# Patient Record
Sex: FEMALE | Race: WHITE | HISPANIC OR LATINO | URBAN - METROPOLITAN AREA
[De-identification: names, ages, dates, MRNs, and addresses within clinical notes are randomized per-mention and may not be internally consistent; named-entity substitution may affect disease eponyms.]

---

## 2022-08-27 ENCOUNTER — INPATIENT (INPATIENT)
Facility: HOSPITAL | Age: 32
LOS: 3 days | Discharge: HOME | DRG: 103 | End: 2022-08-31
Attending: HOSPITALIST | Admitting: STUDENT IN AN ORGANIZED HEALTH CARE EDUCATION/TRAINING PROGRAM
Payer: COMMERCIAL

## 2022-08-27 VITALS
WEIGHT: 160.06 LBS | SYSTOLIC BLOOD PRESSURE: 120 MMHG | TEMPERATURE: 99 F | HEART RATE: 126 BPM | DIASTOLIC BLOOD PRESSURE: 74 MMHG | OXYGEN SATURATION: 98 % | RESPIRATION RATE: 20 BRPM

## 2022-08-27 LAB
ALBUMIN SERPL ELPH-MCNC: 4.2 G/DL — SIGNIFICANT CHANGE UP (ref 3.3–5.2)
ALP SERPL-CCNC: 80 U/L — SIGNIFICANT CHANGE UP (ref 40–120)
ALT FLD-CCNC: 16 U/L — SIGNIFICANT CHANGE UP
ANION GAP SERPL CALC-SCNC: 19 MMOL/L — HIGH (ref 5–17)
APAP SERPL-MCNC: <3 UG/ML — LOW (ref 10–26)
AST SERPL-CCNC: 25 U/L — SIGNIFICANT CHANGE UP
BASE EXCESS BLDV CALC-SCNC: -2 MMOL/L — SIGNIFICANT CHANGE UP (ref -2–3)
BASE EXCESS BLDV CALC-SCNC: -6.3 MMOL/L — LOW (ref -2–3)
BASOPHILS # BLD AUTO: 0.04 K/UL — SIGNIFICANT CHANGE UP (ref 0–0.2)
BASOPHILS NFR BLD AUTO: 0.5 % — SIGNIFICANT CHANGE UP (ref 0–2)
BILIRUB SERPL-MCNC: 0.5 MG/DL — SIGNIFICANT CHANGE UP (ref 0.4–2)
BUN SERPL-MCNC: 9.2 MG/DL — SIGNIFICANT CHANGE UP (ref 8–20)
CA-I SERPL-SCNC: 1.01 MMOL/L — LOW (ref 1.15–1.33)
CA-I SERPL-SCNC: 1.01 MMOL/L — LOW (ref 1.15–1.33)
CALCIUM SERPL-MCNC: 9 MG/DL — SIGNIFICANT CHANGE UP (ref 8.4–10.5)
CHLORIDE BLDV-SCNC: 109 MMOL/L — HIGH (ref 98–107)
CHLORIDE BLDV-SCNC: 110 MMOL/L — HIGH (ref 98–107)
CHLORIDE SERPL-SCNC: 104 MMOL/L — SIGNIFICANT CHANGE UP (ref 98–107)
CO2 SERPL-SCNC: 14 MMOL/L — LOW (ref 22–29)
CREAT SERPL-MCNC: 0.83 MG/DL — SIGNIFICANT CHANGE UP (ref 0.5–1.3)
EGFR: 97 ML/MIN/1.73M2 — SIGNIFICANT CHANGE UP
EOSINOPHIL # BLD AUTO: 0.11 K/UL — SIGNIFICANT CHANGE UP (ref 0–0.5)
EOSINOPHIL NFR BLD AUTO: 1.4 % — SIGNIFICANT CHANGE UP (ref 0–6)
ETHANOL SERPL-MCNC: <10 MG/DL — SIGNIFICANT CHANGE UP (ref 0–9)
GAS PNL BLDV: 139 MMOL/L — SIGNIFICANT CHANGE UP (ref 136–145)
GAS PNL BLDV: 140 MMOL/L — SIGNIFICANT CHANGE UP (ref 136–145)
GAS PNL BLDV: SIGNIFICANT CHANGE UP
GAS PNL BLDV: SIGNIFICANT CHANGE UP
GLUCOSE BLDV-MCNC: 110 MG/DL — HIGH (ref 70–99)
GLUCOSE BLDV-MCNC: 117 MG/DL — HIGH (ref 70–99)
GLUCOSE SERPL-MCNC: 115 MG/DL — HIGH (ref 70–99)
HCG SERPL-ACNC: <4 MIU/ML — SIGNIFICANT CHANGE UP
HCO3 BLDV-SCNC: 16 MMOL/L — LOW (ref 22–29)
HCO3 BLDV-SCNC: 23 MMOL/L — SIGNIFICANT CHANGE UP (ref 22–29)
HCT VFR BLD CALC: 36.4 % — SIGNIFICANT CHANGE UP (ref 34.5–45)
HCT VFR BLDA CALC: 35 % — SIGNIFICANT CHANGE UP
HCT VFR BLDA CALC: 40 % — SIGNIFICANT CHANGE UP
HGB BLD CALC-MCNC: 11.6 G/DL — LOW (ref 11.7–16.1)
HGB BLD CALC-MCNC: 13.3 G/DL — SIGNIFICANT CHANGE UP (ref 11.7–16.1)
HGB BLD-MCNC: 12.4 G/DL — SIGNIFICANT CHANGE UP (ref 11.5–15.5)
IMM GRANULOCYTES NFR BLD AUTO: 0.4 % — SIGNIFICANT CHANGE UP (ref 0–1.5)
LACTATE BLDV-MCNC: 2.1 MMOL/L — HIGH (ref 0.5–2)
LACTATE BLDV-MCNC: 6.1 MMOL/L — CRITICAL HIGH (ref 0.5–2)
LIDOCAIN IGE QN: 24 U/L — SIGNIFICANT CHANGE UP (ref 22–51)
LYMPHOCYTES # BLD AUTO: 2.23 K/UL — SIGNIFICANT CHANGE UP (ref 1–3.3)
LYMPHOCYTES # BLD AUTO: 28.3 % — SIGNIFICANT CHANGE UP (ref 13–44)
MCHC RBC-ENTMCNC: 29.6 PG — SIGNIFICANT CHANGE UP (ref 27–34)
MCHC RBC-ENTMCNC: 34.1 GM/DL — SIGNIFICANT CHANGE UP (ref 32–36)
MCV RBC AUTO: 86.9 FL — SIGNIFICANT CHANGE UP (ref 80–100)
MONOCYTES # BLD AUTO: 0.63 K/UL — SIGNIFICANT CHANGE UP (ref 0–0.9)
MONOCYTES NFR BLD AUTO: 8 % — SIGNIFICANT CHANGE UP (ref 2–14)
NEUTROPHILS # BLD AUTO: 4.85 K/UL — SIGNIFICANT CHANGE UP (ref 1.8–7.4)
NEUTROPHILS NFR BLD AUTO: 61.4 % — SIGNIFICANT CHANGE UP (ref 43–77)
PCO2 BLDV: 39 MMHG — SIGNIFICANT CHANGE UP (ref 39–42)
PCO2 BLDV: <20 MMHG — LOW (ref 39–42)
PH BLDV: 7.38 — SIGNIFICANT CHANGE UP (ref 7.32–7.43)
PH BLDV: 7.54 — HIGH (ref 7.32–7.43)
PLATELET # BLD AUTO: 351 K/UL — SIGNIFICANT CHANGE UP (ref 150–400)
PO2 BLDV: 56 MMHG — HIGH (ref 25–45)
PO2 BLDV: 94 MMHG — HIGH (ref 25–45)
POTASSIUM BLDV-SCNC: 3.4 MMOL/L — LOW (ref 3.5–5.1)
POTASSIUM BLDV-SCNC: 3.6 MMOL/L — SIGNIFICANT CHANGE UP (ref 3.5–5.1)
POTASSIUM SERPL-MCNC: 3.1 MMOL/L — LOW (ref 3.5–5.3)
POTASSIUM SERPL-SCNC: 3.1 MMOL/L — LOW (ref 3.5–5.3)
PROT SERPL-MCNC: 7.2 G/DL — SIGNIFICANT CHANGE UP (ref 6.6–8.7)
PROT UR-MCNC: NEGATIVE — SIGNIFICANT CHANGE UP
RBC # BLD: 4.19 M/UL — SIGNIFICANT CHANGE UP (ref 3.8–5.2)
RBC # FLD: 12.1 % — SIGNIFICANT CHANGE UP (ref 10.3–14.5)
SALICYLATES SERPL-MCNC: <0.6 MG/DL — LOW (ref 10–20)
SAO2 % BLDV: 87.5 % — SIGNIFICANT CHANGE UP
SAO2 % BLDV: 99 % — SIGNIFICANT CHANGE UP
SODIUM SERPL-SCNC: 137 MMOL/L — SIGNIFICANT CHANGE UP (ref 135–145)
T3 SERPL-MCNC: 172 NG/DL — SIGNIFICANT CHANGE UP (ref 80–200)
T4 AB SER-ACNC: 10 UG/DL — SIGNIFICANT CHANGE UP (ref 4.5–12)
TROPONIN T SERPL-MCNC: <0.01 NG/ML — SIGNIFICANT CHANGE UP (ref 0–0.06)
TROPONIN T SERPL-MCNC: <0.01 NG/ML — SIGNIFICANT CHANGE UP (ref 0–0.06)
TSH SERPL-MCNC: 3.71 UIU/ML — SIGNIFICANT CHANGE UP (ref 0.27–4.2)
WBC # BLD: 7.89 K/UL — SIGNIFICANT CHANGE UP (ref 3.8–10.5)
WBC # FLD AUTO: 7.89 K/UL — SIGNIFICANT CHANGE UP (ref 3.8–10.5)

## 2022-08-27 PROCEDURE — 71045 X-RAY EXAM CHEST 1 VIEW: CPT | Mod: 26

## 2022-08-27 PROCEDURE — 70498 CT ANGIOGRAPHY NECK: CPT | Mod: 26,MA

## 2022-08-27 PROCEDURE — 99291 CRITICAL CARE FIRST HOUR: CPT

## 2022-08-27 PROCEDURE — 70496 CT ANGIOGRAPHY HEAD: CPT | Mod: 26,MA

## 2022-08-27 RX ORDER — LEVETIRACETAM 250 MG/1
1000 TABLET, FILM COATED ORAL ONCE
Refills: 0 | Status: COMPLETED | OUTPATIENT
Start: 2022-08-27 | End: 2022-08-27

## 2022-08-27 RX ADMIN — LEVETIRACETAM 1000 MILLIGRAM(S): 250 TABLET, FILM COATED ORAL at 23:08

## 2022-08-27 NOTE — ED ADULT TRIAGE NOTE - CHIEF COMPLAINT QUOTE
BIBEMS for a suspected panic attack. PT was anxious and not responding to family and EMS unless there was a sternal rub./ PT found to be in SVT by EMS and given 6mg and 12mg of adenosine and then she broke. PT know minimal responsive but shaking her head yes when asked if she has chest pain. No PMH. ED attending called ot bedside.

## 2022-08-27 NOTE — ED PROVIDER NOTE - PROGRESS NOTE DETAILS
Given the significant and immediate threats to this patient based on initial presentation, the benefits of emergency contrast-enhanced CT imaging without obtaining GFR/creatinine serum level results greatly outweigh the potential risk of harm due to contrast-induced nephropathy. Ludmila Barry. PGY-2 Patient more oriented and family interviewed at bedside. Patient reports she was vacuuming when she began to have a migraine and felt-lightheaded. She sat down and then called for help from her mother. Last thing she remembers is waking up in the ED. Mom reports that the patient started to shake and her eyes rolled to the back of her head. This happened twice and then she called the ambulance. No urinary incontinence. Patient reports a similar event happened a couple of months ago. She went to a different hospital and then was discharged from ED after evaluation. Denies h/o of seizures, head trauma, drug use, or new recent medications. No recent illness. Spoke to family about possible admission for evaluation by neurology given episode has happened 3 times so far. Lactate 6. Patient fully oriented and at baseline currently. Ludmila Barry. PGY-2 Patient's NAme: Georgina Andreea  : 1990

## 2022-08-27 NOTE — ED PROVIDER NOTE - ATTENDING CONTRIBUTION TO CARE
Dr. Schneider : I have personally seen and examined this patient at the bedside. I have fully participated in the care of this patient. I have reviewed all pertinent clinical information, including history, physical exam, plan and the Resident's note and agree except as noted.     30yF with no known pmh presenting with AMS. Per EMS patient reportedly patient was unresponsive to family and staring. On arrival EMS noted patient to be tachycardic. They gave 6mg and 12mg of adenosine for presumed SVT. pt able to shake head to yes or no questions. understands all questions but is not speaking. possible argument with family before this started?    Denies f/c/n/v/cp/sob/palpitations/cough/abd.pain/d/c/dysuria/hematuria.  no sick contacts/recent travel.    PE:  head; atraumatic normocephalic  eyes: perrla  Heart: rrr s1s2  lungs: ctab  abd: soft, nt nd + bs no rebound/guarding no cva ttp  le: no swelling no calf ttp  back: no midline cervical/thoracic/lumbar ttp  neuro: follos commands moving all extremeties non focal exam      -->conversions disorder? will check ct head cta as pt not speaking but no other neuro deifcit; labs lactate reassess

## 2022-08-27 NOTE — ED PROVIDER NOTE - PHYSICAL EXAMINATION
Gen: staring and unresponsive  Head: normocephalic, atraumatic  EENT: EOMI, PERRLA  Lung: no increased work of breathing, CTABL  CV: normal s1/s2, tachy2+ radial pulses b/l  Abd: soft, non-tender, non-distended, no rebound tenderness or guarding  MSK: No edema, no visible deformities, full range of motion in all 4 extremities  Neuro: No focal neurologic deficits

## 2022-08-27 NOTE — ED PROVIDER NOTE - OBJECTIVE STATEMENT
30yF with no known pmh presenting with AMS. Per EMS patient reportedly patient 30yF with no known pmh presenting with AMS. Per EMS patient reportedly patient was unresponsive to family and staring. On arrival EMS noted patient to be tachycardic. They gave 6mg and 12mg of adenosine for presumed SVT. Attending and myself examined rhythm strip. HR was in the 140s and tracing had p waves inconsistent with SVT. Patient in sinus on the monitor in the ED and on EKG. Patient staring and unresponsive to questions.

## 2022-08-27 NOTE — ED PROVIDER NOTE - CLINICAL SUMMARY MEDICAL DECISION MAKING FREE TEXT BOX
30yF with no known pmh presenting with AMS. Priority CT called from ambulance triage. Patient with AMS of unknown origin. Afebrile. Satting well on room air. Sinus tach on EKG. Will check tox screen, normal labs, TSH. Will attempt to get collateral from family.

## 2022-08-28 DIAGNOSIS — R55 SYNCOPE AND COLLAPSE: ICD-10-CM

## 2022-08-28 LAB
APPEARANCE UR: CLEAR — SIGNIFICANT CHANGE UP
BACTERIA # UR AUTO: ABNORMAL
BILIRUB UR-MCNC: NEGATIVE — SIGNIFICANT CHANGE UP
COLOR SPEC: YELLOW — SIGNIFICANT CHANGE UP
DIFF PNL FLD: ABNORMAL
EPI CELLS # UR: ABNORMAL
GLUCOSE UR QL: NEGATIVE — SIGNIFICANT CHANGE UP
KETONES UR-MCNC: NEGATIVE — SIGNIFICANT CHANGE UP
LEUKOCYTE ESTERASE UR-ACNC: ABNORMAL
NITRITE UR-MCNC: POSITIVE
PH UR: 7 — SIGNIFICANT CHANGE UP (ref 5–8)
RAPID RVP RESULT: SIGNIFICANT CHANGE UP
RBC CASTS # UR COMP ASSIST: ABNORMAL /HPF (ref 0–4)
SARS-COV-2 RNA SPEC QL NAA+PROBE: SIGNIFICANT CHANGE UP
SP GR SPEC: 1 — LOW (ref 1.01–1.02)
UROBILINOGEN FLD QL: 1
WBC UR QL: SIGNIFICANT CHANGE UP /HPF (ref 0–5)

## 2022-08-28 PROCEDURE — 99223 1ST HOSP IP/OBS HIGH 75: CPT

## 2022-08-28 PROCEDURE — 12345: CPT | Mod: NC

## 2022-08-28 RX ORDER — HEPARIN SODIUM 5000 [USP'U]/ML
5000 INJECTION INTRAVENOUS; SUBCUTANEOUS EVERY 8 HOURS
Refills: 0 | Status: DISCONTINUED | OUTPATIENT
Start: 2022-08-28 | End: 2022-08-31

## 2022-08-28 RX ADMIN — HEPARIN SODIUM 5000 UNIT(S): 5000 INJECTION INTRAVENOUS; SUBCUTANEOUS at 05:07

## 2022-08-28 RX ADMIN — Medication 1 TABLET(S): at 05:08

## 2022-08-28 RX ADMIN — Medication 1 TABLET(S): at 18:06

## 2022-08-28 NOTE — H&P ADULT - HISTORY OF PRESENT ILLNESS
29 y/o f with pmh of headaches presents to [facility] ED after witnessed episode of seizure like activity. As per pt she recalls being at home in her room and felt a headache that has lasted for 2 days. Pt then felt her hands sweaty and tingly, and then closed her eyes and awoke in ER. As per her mother, she witnessed her daughter shaking and her eyes rolling to the back of her head. Pts mother states her mother would have episodes similar to this when she was angry. Pt had a previous episode in [facility] 1 year ago as well. Pt states she did begin a new medication for her chronic headache by her PCP but unsure of the name. Pt denies current chest pain, no headache, no sob, no palpitations, no nausea, vomiting, diarrhea, constipation ROS otherwise negative     ED course: S/P keppra given in ER. As per ED resident, pt was given adenosine (6mg, then 12) on her way in ambulance for presumed SVT, which was later reviewed by ER attending as sinus tachy. UA + for for nitrites and many bacteria. Lactate >6 on VBG , on repeat 2.

## 2022-08-28 NOTE — H&P ADULT - NSHPPHYSICALEXAM_GEN_ALL_CORE
T(C): 37.1 (08-27-22 @ 19:25), Max: 37.1 (08-27-22 @ 19:25)  HR: 126 (08-27-22 @ 19:25) (126 - 126)  BP: 120/74 (08-27-22 @ 19:25) (120/74 - 120/74)  RR: 20 (08-27-22 @ 19:25) (20 - 20)  SpO2: 98% (08-27-22 @ 19:25) (98% - 98%)    PHYSICAL EXAM:  GENERAL: NAD, well-developed  HEAD:  Atraumatic, Normocephalic  EYES: EOMI, PERRLA, conjunctiva and sclera clear  ENT: Normal tympanic membrane. No nasal obstruction or discharge. No tonsillar exudate, swelling or erythema.  NECK: Supple, No JVD  RESP: Clear to auscultation bilaterally; No wheeze  CARD:  tachycardic,  No murmurs, rubs, or gallops  GASTRO: Soft, Nontender, Nondistended; Bowel sounds present  EXTREMITIES:  2+ Peripheral Pulses, No clubbing, cyanosis, or edema  PSYCH: AAOx3  NEUROLOGY: non-focal  SKIN: No rashes or lesions

## 2022-08-28 NOTE — H&P ADULT - NSHPLABSRESULTS_GEN_ALL_CORE
12.4   7.89  )-----------( 351      ( 27 Aug 2022 19:40 )             36.4   08-27    137  |  104  |  9.2  ----------------------------<  115<H>  3.1<L>   |  14.0<L>  |  0.83    Ca    9.0      27 Aug 2022 19:40    TPro  7.2  /  Alb  4.2  /  TBili  0.5  /  DBili  x   /  AST  25  /  ALT  16  /  AlkPhos  80  08-27      < from: CT Angio Neck w/ IV Cont (08.27.22 @ 20:01) >    IMPRESSION:    Noncontrast CT Head: No acute intracranal hemorrhage, mass effect, or   evidence of acute vascular territorial infarct.    CTA Neck: Unremarkable cervical carotid and vertebral arteries.    CTA Head: No proximal large vessel occlusion or evidence of intracranial     < end of copied text >

## 2022-08-28 NOTE — H&P ADULT - ASSESSMENT
29 y/o f with pmh of headaches presents to [facility] ED after witnessed episode of seizure like activity presented to [facility] ED for witness seizure like activity    admit to medicine  telemetry overnight  Neuro consulted in ER for seizure work up     Witnessed seizure  - Family member witnessed pt with shaking, eyes rolling  - Pt does not recall this event. S/P keppra in ED   - CT imaging negative for acute infarcts  - Geovany consulted in ER for seizure work up .      UTI  - UA + for nitrites, bacteria  - Pending culture  - Bactrim DS started       Hx of headaches  - As per pt, multiple meds tried . Recently placed on a new med 2 months ago, cannot recall name. Check pharmacy in AM      Supportive  - Heparin subq   - Diet    Dispo: As per mother, similar episodes happened to her mother when she got angry or emotional. Pt had an episode like this last year in [facility]. Pharmacy is [facility]. Med rec in AM.

## 2022-08-29 DIAGNOSIS — R07.9 CHEST PAIN, UNSPECIFIED: ICD-10-CM

## 2022-08-29 DIAGNOSIS — R00.0 TACHYCARDIA, UNSPECIFIED: ICD-10-CM

## 2022-08-29 PROBLEM — Z00.00 ENCOUNTER FOR PREVENTIVE HEALTH EXAMINATION: Status: ACTIVE | Noted: 2022-08-29

## 2022-08-29 LAB
A1C WITH ESTIMATED AVERAGE GLUCOSE RESULT: 5.9 % — HIGH (ref 4–5.6)
ALBUMIN SERPL ELPH-MCNC: 4.5 G/DL — SIGNIFICANT CHANGE UP (ref 3.3–5.2)
ALP SERPL-CCNC: 85 U/L — SIGNIFICANT CHANGE UP (ref 40–120)
ALT FLD-CCNC: 16 U/L — SIGNIFICANT CHANGE UP
ANION GAP SERPL CALC-SCNC: 13 MMOL/L — SIGNIFICANT CHANGE UP (ref 5–17)
ANION GAP SERPL CALC-SCNC: 16 MMOL/L — SIGNIFICANT CHANGE UP (ref 5–17)
APTT BLD: 27.8 SEC — SIGNIFICANT CHANGE UP (ref 27.5–35.5)
AST SERPL-CCNC: 21 U/L — SIGNIFICANT CHANGE UP
BASOPHILS # BLD AUTO: 0.04 K/UL — SIGNIFICANT CHANGE UP (ref 0–0.2)
BASOPHILS # BLD AUTO: 0.04 K/UL — SIGNIFICANT CHANGE UP (ref 0–0.2)
BASOPHILS NFR BLD AUTO: 0.6 % — SIGNIFICANT CHANGE UP (ref 0–2)
BASOPHILS NFR BLD AUTO: 0.7 % — SIGNIFICANT CHANGE UP (ref 0–2)
BILIRUB SERPL-MCNC: 0.4 MG/DL — SIGNIFICANT CHANGE UP (ref 0.4–2)
BLD GP AB SCN SERPL QL: SIGNIFICANT CHANGE UP
BUN SERPL-MCNC: 10.3 MG/DL — SIGNIFICANT CHANGE UP (ref 8–20)
BUN SERPL-MCNC: 11.9 MG/DL — SIGNIFICANT CHANGE UP (ref 8–20)
CALCIUM SERPL-MCNC: 8.9 MG/DL — SIGNIFICANT CHANGE UP (ref 8.4–10.5)
CALCIUM SERPL-MCNC: 9.7 MG/DL — SIGNIFICANT CHANGE UP (ref 8.4–10.5)
CHLORIDE SERPL-SCNC: 103 MMOL/L — SIGNIFICANT CHANGE UP (ref 98–107)
CHLORIDE SERPL-SCNC: 105 MMOL/L — SIGNIFICANT CHANGE UP (ref 98–107)
CHOLEST SERPL-MCNC: 192 MG/DL — SIGNIFICANT CHANGE UP
CK SERPL-CCNC: 57 U/L — SIGNIFICANT CHANGE UP (ref 25–170)
CO2 SERPL-SCNC: 18 MMOL/L — LOW (ref 22–29)
CO2 SERPL-SCNC: 21 MMOL/L — LOW (ref 22–29)
CREAT SERPL-MCNC: 0.71 MG/DL — SIGNIFICANT CHANGE UP (ref 0.5–1.3)
CREAT SERPL-MCNC: 0.79 MG/DL — SIGNIFICANT CHANGE UP (ref 0.5–1.3)
EGFR: 102 ML/MIN/1.73M2 — SIGNIFICANT CHANGE UP
EGFR: 116 ML/MIN/1.73M2 — SIGNIFICANT CHANGE UP
EOSINOPHIL # BLD AUTO: 0.15 K/UL — SIGNIFICANT CHANGE UP (ref 0–0.5)
EOSINOPHIL # BLD AUTO: 0.22 K/UL — SIGNIFICANT CHANGE UP (ref 0–0.5)
EOSINOPHIL NFR BLD AUTO: 2.3 % — SIGNIFICANT CHANGE UP (ref 0–6)
EOSINOPHIL NFR BLD AUTO: 4 % — SIGNIFICANT CHANGE UP (ref 0–6)
ESTIMATED AVERAGE GLUCOSE: 123 MG/DL — HIGH (ref 68–114)
GLUCOSE BLDC GLUCOMTR-MCNC: 144 MG/DL — HIGH (ref 70–99)
GLUCOSE SERPL-MCNC: 102 MG/DL — HIGH (ref 70–99)
GLUCOSE SERPL-MCNC: 150 MG/DL — HIGH (ref 70–99)
HCT VFR BLD CALC: 37.5 % — SIGNIFICANT CHANGE UP (ref 34.5–45)
HCT VFR BLD CALC: 39.2 % — SIGNIFICANT CHANGE UP (ref 34.5–45)
HDLC SERPL-MCNC: 46 MG/DL — LOW
HGB BLD-MCNC: 12.6 G/DL — SIGNIFICANT CHANGE UP (ref 11.5–15.5)
HGB BLD-MCNC: 13.3 G/DL — SIGNIFICANT CHANGE UP (ref 11.5–15.5)
IMM GRANULOCYTES NFR BLD AUTO: 0.2 % — SIGNIFICANT CHANGE UP (ref 0–1.5)
IMM GRANULOCYTES NFR BLD AUTO: 0.5 % — SIGNIFICANT CHANGE UP (ref 0–1.5)
INR BLD: 1.03 RATIO — SIGNIFICANT CHANGE UP (ref 0.88–1.16)
LACTATE BLDV-MCNC: 2.4 MMOL/L — HIGH (ref 0.5–2)
LIPID PNL WITH DIRECT LDL SERPL: 88 MG/DL — SIGNIFICANT CHANGE UP
LYMPHOCYTES # BLD AUTO: 2.03 K/UL — SIGNIFICANT CHANGE UP (ref 1–3.3)
LYMPHOCYTES # BLD AUTO: 2.79 K/UL — SIGNIFICANT CHANGE UP (ref 1–3.3)
LYMPHOCYTES # BLD AUTO: 36.7 % — SIGNIFICANT CHANGE UP (ref 13–44)
LYMPHOCYTES # BLD AUTO: 43.2 % — SIGNIFICANT CHANGE UP (ref 13–44)
MAGNESIUM SERPL-MCNC: 2 MG/DL — SIGNIFICANT CHANGE UP (ref 1.6–2.6)
MAGNESIUM SERPL-MCNC: 2 MG/DL — SIGNIFICANT CHANGE UP (ref 1.8–2.6)
MCHC RBC-ENTMCNC: 29.7 PG — SIGNIFICANT CHANGE UP (ref 27–34)
MCHC RBC-ENTMCNC: 30 PG — SIGNIFICANT CHANGE UP (ref 27–34)
MCHC RBC-ENTMCNC: 33.6 GM/DL — SIGNIFICANT CHANGE UP (ref 32–36)
MCHC RBC-ENTMCNC: 33.9 GM/DL — SIGNIFICANT CHANGE UP (ref 32–36)
MCV RBC AUTO: 87.5 FL — SIGNIFICANT CHANGE UP (ref 80–100)
MCV RBC AUTO: 89.3 FL — SIGNIFICANT CHANGE UP (ref 80–100)
MONOCYTES # BLD AUTO: 0.51 K/UL — SIGNIFICANT CHANGE UP (ref 0–0.9)
MONOCYTES # BLD AUTO: 0.56 K/UL — SIGNIFICANT CHANGE UP (ref 0–0.9)
MONOCYTES NFR BLD AUTO: 8.7 % — SIGNIFICANT CHANGE UP (ref 2–14)
MONOCYTES NFR BLD AUTO: 9.2 % — SIGNIFICANT CHANGE UP (ref 2–14)
NEUTROPHILS # BLD AUTO: 2.72 K/UL — SIGNIFICANT CHANGE UP (ref 1.8–7.4)
NEUTROPHILS # BLD AUTO: 2.89 K/UL — SIGNIFICANT CHANGE UP (ref 1.8–7.4)
NEUTROPHILS NFR BLD AUTO: 44.7 % — SIGNIFICANT CHANGE UP (ref 43–77)
NEUTROPHILS NFR BLD AUTO: 49.2 % — SIGNIFICANT CHANGE UP (ref 43–77)
NON HDL CHOLESTEROL: 146 MG/DL — HIGH
PHOSPHATE SERPL-MCNC: 1.7 MG/DL — LOW (ref 2.4–4.7)
PHOSPHATE SERPL-MCNC: 3.6 MG/DL — SIGNIFICANT CHANGE UP (ref 2.4–4.7)
PLATELET # BLD AUTO: 308 K/UL — SIGNIFICANT CHANGE UP (ref 150–400)
PLATELET # BLD AUTO: 352 K/UL — SIGNIFICANT CHANGE UP (ref 150–400)
POTASSIUM SERPL-MCNC: 3.4 MMOL/L — LOW (ref 3.5–5.3)
POTASSIUM SERPL-MCNC: 3.5 MMOL/L — SIGNIFICANT CHANGE UP (ref 3.5–5.3)
POTASSIUM SERPL-SCNC: 3.4 MMOL/L — LOW (ref 3.5–5.3)
POTASSIUM SERPL-SCNC: 3.5 MMOL/L — SIGNIFICANT CHANGE UP (ref 3.5–5.3)
PROCALCITONIN SERPL-MCNC: 0.05 NG/ML — SIGNIFICANT CHANGE UP (ref 0.02–0.1)
PROT SERPL-MCNC: 7.7 G/DL — SIGNIFICANT CHANGE UP (ref 6.6–8.7)
PROTHROM AB SERPL-ACNC: 12 SEC — SIGNIFICANT CHANGE UP (ref 10.5–13.4)
RBC # BLD: 4.2 M/UL — SIGNIFICANT CHANGE UP (ref 3.8–5.2)
RBC # BLD: 4.48 M/UL — SIGNIFICANT CHANGE UP (ref 3.8–5.2)
RBC # FLD: 12.5 % — SIGNIFICANT CHANGE UP (ref 10.3–14.5)
RBC # FLD: 12.5 % — SIGNIFICANT CHANGE UP (ref 10.3–14.5)
SODIUM SERPL-SCNC: 137 MMOL/L — SIGNIFICANT CHANGE UP (ref 135–145)
SODIUM SERPL-SCNC: 139 MMOL/L — SIGNIFICANT CHANGE UP (ref 135–145)
TRIGL SERPL-MCNC: 292 MG/DL — HIGH
TROPONIN T SERPL-MCNC: <0.01 NG/ML — SIGNIFICANT CHANGE UP (ref 0–0.06)
TSH SERPL-MCNC: 2.39 UIU/ML — SIGNIFICANT CHANGE UP (ref 0.27–4.2)
WBC # BLD: 5.53 K/UL — SIGNIFICANT CHANGE UP (ref 3.8–10.5)
WBC # BLD: 6.46 K/UL — SIGNIFICANT CHANGE UP (ref 3.8–10.5)
WBC # FLD AUTO: 5.53 K/UL — SIGNIFICANT CHANGE UP (ref 3.8–10.5)
WBC # FLD AUTO: 6.46 K/UL — SIGNIFICANT CHANGE UP (ref 3.8–10.5)

## 2022-08-29 PROCEDURE — 93010 ELECTROCARDIOGRAM REPORT: CPT

## 2022-08-29 PROCEDURE — 70498 CT ANGIOGRAPHY NECK: CPT | Mod: 26

## 2022-08-29 PROCEDURE — 99233 SBSQ HOSP IP/OBS HIGH 50: CPT

## 2022-08-29 PROCEDURE — 99223 1ST HOSP IP/OBS HIGH 75: CPT

## 2022-08-29 PROCEDURE — 99254 IP/OBS CNSLTJ NEW/EST MOD 60: CPT

## 2022-08-29 PROCEDURE — 70496 CT ANGIOGRAPHY HEAD: CPT | Mod: 26

## 2022-08-29 PROCEDURE — 0042T: CPT

## 2022-08-29 PROCEDURE — 99232 SBSQ HOSP IP/OBS MODERATE 35: CPT

## 2022-08-29 PROCEDURE — 95720 EEG PHY/QHP EA INCR W/VEEG: CPT

## 2022-08-29 PROCEDURE — 71045 X-RAY EXAM CHEST 1 VIEW: CPT | Mod: 26

## 2022-08-29 RX ORDER — NITROGLYCERIN 6.5 MG
0.4 CAPSULE, EXTENDED RELEASE ORAL
Refills: 0 | Status: DISCONTINUED | OUTPATIENT
Start: 2022-08-29 | End: 2022-08-31

## 2022-08-29 RX ORDER — SODIUM CHLORIDE 9 MG/ML
500 INJECTION INTRAMUSCULAR; INTRAVENOUS; SUBCUTANEOUS ONCE
Refills: 0 | Status: COMPLETED | OUTPATIENT
Start: 2022-08-29 | End: 2022-08-29

## 2022-08-29 RX ORDER — SODIUM CHLORIDE 9 MG/ML
1000 INJECTION INTRAMUSCULAR; INTRAVENOUS; SUBCUTANEOUS
Refills: 0 | Status: DISCONTINUED | OUTPATIENT
Start: 2022-08-29 | End: 2022-08-31

## 2022-08-29 RX ORDER — POTASSIUM CHLORIDE 20 MEQ
10 PACKET (EA) ORAL
Refills: 0 | Status: COMPLETED | OUTPATIENT
Start: 2022-08-29 | End: 2022-08-29

## 2022-08-29 RX ORDER — ASPIRIN/CALCIUM CARB/MAGNESIUM 324 MG
324 TABLET ORAL ONCE
Refills: 0 | Status: COMPLETED | OUTPATIENT
Start: 2022-08-29 | End: 2022-08-29

## 2022-08-29 RX ADMIN — Medication 100 MILLIEQUIVALENT(S): at 22:33

## 2022-08-29 RX ADMIN — Medication 100 MILLIEQUIVALENT(S): at 16:12

## 2022-08-29 RX ADMIN — Medication 1 TABLET(S): at 17:23

## 2022-08-29 RX ADMIN — SODIUM CHLORIDE 2000 MILLILITER(S): 9 INJECTION INTRAMUSCULAR; INTRAVENOUS; SUBCUTANEOUS at 10:50

## 2022-08-29 RX ADMIN — Medication 100 MILLIEQUIVALENT(S): at 14:25

## 2022-08-29 RX ADMIN — HEPARIN SODIUM 5000 UNIT(S): 5000 INJECTION INTRAVENOUS; SUBCUTANEOUS at 23:00

## 2022-08-29 RX ADMIN — Medication 324 MILLIGRAM(S): at 10:50

## 2022-08-29 RX ADMIN — Medication 1 TABLET(S): at 05:52

## 2022-08-29 RX ADMIN — HEPARIN SODIUM 5000 UNIT(S): 5000 INJECTION INTRAVENOUS; SUBCUTANEOUS at 05:52

## 2022-08-29 RX ADMIN — HEPARIN SODIUM 5000 UNIT(S): 5000 INJECTION INTRAVENOUS; SUBCUTANEOUS at 13:33

## 2022-08-29 NOTE — PROGRESS NOTE ADULT - ASSESSMENT
The patient is a 30y Female with questionable seizure activity.     Seizure.   Unclear if epileptic as no tongue biting/incontinence, however description is suspicious.   Suggest c-EEG monitoring for further evaluation.  Would not start antiseizure drug unless EEG with spike activity or if patient has further episodes suspicious for seizure.    Headache.   Currently satisfied with prn medication.    Case discussed with Dr Govea.       The patient is a 30y Female with questionable seizure activity. Also a code stroke was activated on 8-29-22 for her visual complaints and a right facial weakness noted by RN.      IMPRESSION:  Clinically very low suspicion for a stroke given her fluctuating and inconsistent neuro exam.   However could be complex migraine.    Seizure.   Unclear if epileptic as no tongue biting/incontinence, however description is suspicious.   Suggest c-EEG monitoring for further evaluation.  Would not start antiseizure drug unless EEG with spike activity or if patient has further episodes suspicious for seizure.    Headache.   Currently satisfied with prn medication.    ASSESSMENT/ PLAN:       - Neuro checks and vital signs Q 2 hours.  - SBP goal permissive upto 160  for 24 hours and then normotensive.  - ASA 81 mg PO or 300 WI QD.  - Lipitor for LDL goal of < 70 .  - Telemetry monitoring.  - CT/CTA/CTP  - MRI Brain stroke protocol.  - Check fasting Lipid panel and HbA1c  - TTE with bubble study.  - PT OT SLP evaluation.  - SCD/ SQ Lovenox for DVT prophylaxis.           The patient is a 30y Female with questionable seizure activity. Also a code stroke was activated on 8-29-22 for her visual complaints and a right facial weakness noted by RN.      ASSESSMENT/ PLAN:   Clinically very low suspicion for a stroke given her fluctuating and inconsistent neuro exam.   However could be complex migraine.    Seizure.   Unclear if epileptic as no tongue biting/incontinence, however description is suspicious.   Awaiting c-EEG monitoring for further evaluation.  Would not start antiseizure drug unless EEG with spike activity or if patient has further episodes suspicious for seizure.   Neuro checks and vital signs Q 4  hours.  - SBP goal permissive upto 160  for 24 hours and then normotensive.  - ASA 81 mg PO or 300 ND QD.  - Lipitor for LDL goal of < 70 .  - Telemetry monitoring.  - CT/CTA/CTP -images and reports were reviewed.   - MRI Brain stroke protocol.  - Check fasting Lipid panel and HbA1c  - TTE with bubble study.  - PT OT SLP evaluation.  - SCD/ SQ Lovenox for DVT prophylaxis.

## 2022-08-29 NOTE — PROGRESS NOTE ADULT - SUBJECTIVE AND OBJECTIVE BOX
LAST seen Well---                                     Real name: Georgina Granados.    CC: headache and seizure.    HISTORY:  The patient is a 30y Female who reports a long history of chronic headache for many years.   She now presents after an episode of dizziness, chest pain, followed by generalized shaking and stiffness lasting a few minutes and resolving.   She had a similar prior episode in [facility] a year ago.    PAST MEDICAL & SURGICAL HISTORY:  No pertinent past medical history  No significant past surgical history    MEDICATION PRIOR TO ADMISSION:  None    MEDICATIONS  (STANDING):  heparin   Injectable 5000 Unit(s) SubCutaneous every 8 hours  trimethoprim  160 mG/sulfamethoxazole 800 mG 1 Tablet(s) Oral two times a day    Allergies  No Known Drug Allergies  pork (Other)    Intolerances  Penicillin    SOCIAL HISTORY:  Never smoker.   No alcohol.  From [facility] but living here since mid 2021.    FAMILY HISTORY:  Family history of diabetes mellitus (Mother)    ROS:  Constitutional: The patient denies fevers or weight changes.  Neuro: As per HPI.  Eyes: Denies blurry vision.  Ears/nose/throat: Denies Tinnitus.   Cardiac: Denies chest pain. Denies palpitations.  Respiratory: Denies shortness of breath.  GI: Denies abdominal pain, nausea, or vomiting.  : Denies change in urinary pattern.  Integumentary: Denies rash.  Psych: Denies recent mood changes.  Heme: denies easy bleeding/bruising.    Exam:  Vital Signs Last 24 Hrs  T(C): 36.8 (28 Aug 2022 09:07), Max: 37.1 (27 Aug 2022 19:25)  T(F): 98.3 (28 Aug 2022 09:07), Max: 98.8 (27 Aug 2022 19:25)  HR: 93 (28 Aug 2022 09:07) (92 - 126)  BP: 128/84 (28 Aug 2022 09:07) (110/77 - 128/84)  RR: 19 (28 Aug 2022 09:07) (19 - 20)  SpO2: 100% (28 Aug 2022 09:07) (98% - 100%)    Parameters below as of 28 Aug 2022 09:07  Patient On (Oxygen Delivery Method): room air    General: NAD.   Carotid bruits absent.     Mental status: The patient is awake, alert, and fully oriented. There is no aphasia. Attention span is normal. Patient is aware of current events.     Cranial nerves: There is no papilledema. Pupils react symmetrically to light. There is no visual field deficit to confrontation. Extraocular motion is full with no nystagmus.  Facial sensation is intact. Facial musculature is symmetric. Palate elevates symmetrically. Tongue is midline.    Motor: There is normal bulk and tone.  Strength is 5/5 in the right arm and leg.   Strength is 5/5 in the left arm and leg.    Sensation: Intact to light touch and pin. There is no extinction to double simultaneous stimulation.    Reflexes: 2+ throughout and plantar responses are flexor.    Cerebellar: There is no dysmetria on finger to nose testing.    LABS:                         12.4   7.89  )-----------( 351      ( 27 Aug 2022 19:40 )             36.4       08-27    137  |  104  |  9.2  ----------------------------<  115<H>  3.1<L>   |  14.0<L>  |  0.83    Ca    9.0      27 Aug 2022 19:40    TPro  7.2  /  Alb  4.2  /  TBili  0.5  /  DBili  x   /  AST  25  /  ALT  16  /  AlkPhos  80  08-27      RADIOLOGY   CT head images reviewed (and concur with report): There is no acute pathology.                                          Real name: Georgina Granados.    CC: headache and seizure.    HISTORY:  The patient is a 30y Female who reports a long history of chronic headache for many years.   She now presents after an episode of dizziness, chest pain, followed by generalized shaking and stiffness lasting a few minutes and resolving.   She had a similar prior episode in [facility] a year ago.    22  CODE Stroke was activated due to patient's complaint of visual problems and RN noticed right facial droop.  Patient also c/o chest pain.    PAST MEDICAL & SURGICAL HISTORY:  No pertinent past medical history  No significant past surgical history    MEDICATION PRIOR TO ADMISSION:  None    MEDICATIONS  (STANDING):  heparin   Injectable 5000 Unit(s) SubCutaneous every 8 hours  trimethoprim  160 mG/sulfamethoxazole 800 mG 1 Tablet(s) Oral two times a day    Allergies  No Known Drug Allergies  pork (Other)    Intolerances  Penicillin    SOCIAL HISTORY:  Never smoker.   No alcohol.  From [facility] but living here since mid .    FAMILY HISTORY:  Family history of diabetes mellitus (Mother)    Neuro Exam:        Vital Signs Last 24 Hrs  T(C): 36.7 (29 Aug 2022 08:18), Max: 37 (28 Aug 2022 13:05)  T(F): 98.1 (29 Aug 2022 08:18), Max: 98.6 (28 Aug 2022 13:05)  HR: 99 (29 Aug 2022 08:18) (93 - 104)  BP: 131/91 (29 Aug 2022 08:18) (108/77 - 131/91)  BP(mean): --  RR: 18 (29 Aug 2022 08:18) (18 - 19)  SpO2: 100% (29 Aug 2022 08:18) (100% - 100%)    Parameters below as of 29 Aug 2022 08:18  Patient On (Oxygen Delivery Method): room air        MEDICATIONS    heparin   Injectable 5000 Unit(s) SubCutaneous every 8 hours  nitroglycerin     SubLingual 0.4 milliGRAM(s) SubLingual every 5 minutes PRN  trimethoprim  160 mG/sulfamethoxazole 800 mG 1 Tablet(s) Oral two times a day         LABS:  CBC Full  -  ( 29 Aug 2022 02:45 )  WBC Count : 5.53 K/uL  RBC Count : 4.20 M/uL  Hemoglobin : 12.6 g/dL  Hematocrit : 37.5 %  Platelet Count - Automated : 308 K/uL  Mean Cell Volume : 89.3 fl  Mean Cell Hemoglobin : 30.0 pg  Mean Cell Hemoglobin Concentration : 33.6 gm/dL  Auto Neutrophil # : 2.72 K/uL  Auto Lymphocyte # : 2.03 K/uL  Auto Monocyte # : 0.51 K/uL  Auto Eosinophil # : 0.22 K/uL  Auto Basophil # : 0.04 K/uL  Auto Neutrophil % : 49.2 %  Auto Lymphocyte % : 36.7 %  Auto Monocyte % : 9.2 %  Auto Eosinophil % : 4.0 %  Auto Basophil % : 0.7 %    Urinalysis Basic - ( 27 Aug 2022 23:30 )    Color: Yellow / Appearance: Clear / S.005 / pH: x  Gluc: x / Ketone: Negative  / Bili: Negative / Urobili: 1   Blood: x / Protein: Negative / Nitrite: Positive   Leuk Esterase: Trace / RBC: 3-5 /HPF / WBC 3-5 /HPF   Sq Epi: x / Non Sq Epi: Moderate / Bacteria: Many      -    139  |  105  |  11.9  ----------------------------<  102<H>  3.5   |  21.0<L>  |  0.71    Ca    8.9      29 Aug 2022 02:45  Phos  3.6       Mg     2.0         TPro  7.2  /  Alb  4.2  /  TBili  0.5  /  DBili  x   /  AST  25  /  ALT  16  /  AlkPhos  80      LIVER FUNCTIONS - ( 27 Aug 2022 19:40 )  Alb: 4.2 g/dL / Pro: 7.2 g/dL / ALK PHOS: 80 U/L / ALT: 16 U/L / AST: 25 U/L / GGT: x           Hemoglobin A1C:     Neuro Exam:  Her exam fluctuates rapidlyandis inconsistent with any clear neurological localization.  Mental status: The patient is awake, alert, and fully oriented. She is very anxious There is no aphasia.   Cranial nerves:  Pupils react symmetrically to light.  Extraocular motion is full with no nystagmus.   Visual field exam is inconsistent she has monocular diplopia and triplopia.   Facial sensation is intact. Facial musculature is symmetric.    Motor:    There is normal bulk and tone.  Strength is 5/5 in the right arm and leg.   Strength is 5/5 in the left arm and leg. But at times she states she cannot lift her legs.    Sensation: Intact to light touch and pin. There is no extinction to double simultaneous stimulation.    Cerebellar: There is no dysmetria on finger to nose testing.      RADIOLOGY   CT head images reviewed (and concur with report): There is no acute pathology.       LAST seen Well---  last night.                                          Real name: Georgina Granados.    CC: headache and seizure.    HISTORY:  The patient is a 30y Female who reports a long history of chronic headache for many years.   She now presents after an episode of dizziness, chest pain, followed by generalized shaking and stiffness lasting a few minutes and resolving.   She had a similar prior episode in [facility] a year ago.    22  CODE Stroke was activated due to patient's complaint of visual problems and RN noticed right facial droop.  Patient also c/o chest pain.    PAST MEDICAL & SURGICAL HISTORY:  No pertinent past medical history  No significant past surgical history    MEDICATION PRIOR TO ADMISSION:  None    MEDICATIONS  (STANDING):  heparin   Injectable 5000 Unit(s) SubCutaneous every 8 hours  trimethoprim  160 mG/sulfamethoxazole 800 mG 1 Tablet(s) Oral two times a day    Allergies  No Known Drug Allergies  pork (Other)    Intolerances  Penicillin    SOCIAL HISTORY:  Never smoker.   No alcohol.  From [facility] but living here since mid .    FAMILY HISTORY:  Family history of diabetes mellitus (Mother)    Neuro Exam:        Vital Signs Last 24 Hrs  T(C): 36.7 (29 Aug 2022 08:18), Max: 37 (28 Aug 2022 13:05)  T(F): 98.1 (29 Aug 2022 08:18), Max: 98.6 (28 Aug 2022 13:05)  HR: 99 (29 Aug 2022 08:18) (93 - 104)  BP: 131/91 (29 Aug 2022 08:18) (108/77 - 131/91)  BP(mean): --  RR: 18 (29 Aug 2022 08:18) (18 - 19)  SpO2: 100% (29 Aug 2022 08:18) (100% - 100%)    Parameters below as of 29 Aug 2022 08:18  Patient On (Oxygen Delivery Method): room air        MEDICATIONS    heparin   Injectable 5000 Unit(s) SubCutaneous every 8 hours  nitroglycerin     SubLingual 0.4 milliGRAM(s) SubLingual every 5 minutes PRN  trimethoprim  160 mG/sulfamethoxazole 800 mG 1 Tablet(s) Oral two times a day         LABS:  CBC Full  -  ( 29 Aug 2022 02:45 )  WBC Count : 5.53 K/uL  RBC Count : 4.20 M/uL  Hemoglobin : 12.6 g/dL  Hematocrit : 37.5 %  Platelet Count - Automated : 308 K/uL  Mean Cell Volume : 89.3 fl  Mean Cell Hemoglobin : 30.0 pg  Mean Cell Hemoglobin Concentration : 33.6 gm/dL  Auto Neutrophil # : 2.72 K/uL  Auto Lymphocyte # : 2.03 K/uL  Auto Monocyte # : 0.51 K/uL  Auto Eosinophil # : 0.22 K/uL  Auto Basophil # : 0.04 K/uL  Auto Neutrophil % : 49.2 %  Auto Lymphocyte % : 36.7 %  Auto Monocyte % : 9.2 %  Auto Eosinophil % : 4.0 %  Auto Basophil % : 0.7 %    Urinalysis Basic - ( 27 Aug 2022 23:30 )    Color: Yellow / Appearance: Clear / S.005 / pH: x  Gluc: x / Ketone: Negative  / Bili: Negative / Urobili: 1   Blood: x / Protein: Negative / Nitrite: Positive   Leuk Esterase: Trace / RBC: 3-5 /HPF / WBC 3-5 /HPF   Sq Epi: x / Non Sq Epi: Moderate / Bacteria: Many      -    139  |  105  |  11.9  ----------------------------<  102<H>  3.5   |  21.0<L>  |  0.71    Ca    8.9      29 Aug 2022 02:45  Phos  3.6       Mg     2.0         TPro  7.2  /  Alb  4.2  /  TBili  0.5  /  DBili  x   /  AST  25  /  ALT  16  /  AlkPhos  80      LIVER FUNCTIONS - ( 27 Aug 2022 19:40 )  Alb: 4.2 g/dL / Pro: 7.2 g/dL / ALK PHOS: 80 U/L / ALT: 16 U/L / AST: 25 U/L / GGT: x           Hemoglobin A1C:     Neuro Exam:  Her exam fluctuates rapidlyandis inconsistent with any clear neurological localization.  Mental status: The patient is awake, alert, and fully oriented. She is very anxious There is no aphasia.   Cranial nerves:  Pupils react symmetrically to light.  Extraocular motion is full with no nystagmus.   Visual field exam is inconsistent she has monocular diplopia and triplopia.   Facial sensation is intact. Facial musculature is symmetric.    Motor:    There is normal bulk and tone.  Strength is 5/5 in the right arm and leg.   Strength is 5/5 in the left arm and leg. But at times she states she cannot lift her legs.    Sensation: Intact to light touch and pin. There is no extinction to double simultaneous stimulation.    Cerebellar: There is no dysmetria on finger to nose testing.      RADIOLOGY   CT head images reviewed (and concur with report)      CT Angio Neck w/ IV Cont (22 @ 10:58) >  IMPRESSION:  *  NORMAL NONCONTRAST CT OF THE BRAIN  *  NO HEMODYNAMIC SIGNIFICANT NARROWING WITHIN THE NECK.  *  NO PROXIMAL LARGE VESSEL OCCLUSION INTRACRANIALLY.  *  NO AREAS OF ISCHEMIA IDENTIFIED ON PERFUSION IMAGING.    Findings discussed with Dr. Barlow at 11:21 AM on 2022      DONA BLACKWELL MD; Attending Radiologist    LAST seen Well---  last night.

## 2022-08-29 NOTE — CONSULT NOTE ADULT - PROBLEM SELECTOR RECOMMENDATION 2
-Trops neg x3  -No acute coronary CP, episode during RRT doesn't fit ACS picture. Only occurs with ?syncope / headache spells. No prior cardiac history  -C/w ASA 81 daily  -Obtain TTE (ordered)

## 2022-08-29 NOTE — RAPID RESPONSE TEAM SUMMARY - NSADDTLFINDINGSRRT_GEN_ALL_CORE
Once RRT called patient became overtly anxious and began to have what is believed to be a panic attack.  Patient became increasingly tachycardia to 150s, now endorsing crushing substernal chest pain with diaphoresis appreciated.  Based on risk vs. benefit for possible cardiac concerns the patient was given Aspirin 324mg chewable x 1 STAT, which was also discussed with the neuro team bedside.  Once RRT called patient became overtly anxious and began to have what is believed to be a panic attack.  Patient became increasingly tachycardia to 150s, now endorsing crushing substernal chest pain with diaphoresis appreciated.  Based on risk vs. benefit for possible cardiac concerns the patient was given Aspirin 324mg chewable x 1 STAT, which was also discussed with the neuro team bedside.     Vital Signs Last 24 Hrs  T(C): 36.7 (29 Aug 2022 08:18), Max: 37 (28 Aug 2022 13:05)  T(F): 98.1 (29 Aug 2022 08:18), Max: 98.6 (28 Aug 2022 13:05)  HR: 85 (29 Aug 2022 11:20) (85 - 104), HR increased to 150s during RRT  BP: 119/83 (29 Aug 2022 11:20) (108/77 - 131/91),  Manual BP obtained bedside during /60  RR: 19 (29 Aug 2022 11:20) (18 - 19)  SpO2: 100% (29 Aug 2022 11:20) (100% - 100%)    Parameters below as of 29 Aug 2022 11:20  Patient On (Oxygen Delivery Method): room air    Initial Physical Exam:   General: NAD, NCAT, resting comfortably in bed, patient appreciated to become extremely anxious once RRT & code stroke called  Cardio: normal S1, S2, no MRG appreciated  Lungs: CTAB, no abnormal breath sounds appreciated, respirations unlabored on RA  Abdomen: soft, non-tender, non-distended, +BS  Extremities: no pedal edema appreciated b/l, 2+ DP & PT b/l, negative Lorne's sign b/l   Neuro: Complete left hemianopia appreciated when checking visual fields, no right visual field loss appreciated, AOX4, Sensation & strength intact distally, no facial droop / weakness /asymmetry appreciated, EOMI, PERRL, no nystagmus appreciated, follows commands, speech is normal without aphasia or dysarthria appreciated, no pronator drift appreciated, no ataxia appreciated, no extinction appreciated.     Once RRT & Code Stroke called patient appreciated to then be endorsing global weakness / numbness and tingling through her entire body bilaterally.  Once patient was brought to CT these symptoms subsided per patient.

## 2022-08-29 NOTE — CONSULT NOTE ADULT - NS ATTEND AMEND GEN_ALL_CORE FT
Syncope or catatonia. unclear cause. Less likely to be cardiac.   one episode in ER RRT.  patient sinus tach. no arrhtyhmias. all narrow complex tachy.  Transthoracic echocardiogram with bubble study.  CT head unremarkable . MRi pending.   will follow up tomorrow ct telemetry

## 2022-08-29 NOTE — CONSULT NOTE ADULT - ASSESSMENT
This is a 32yo RH female with a history of headaches who presented with seizure-like activity. Code Stroke called morning of 8/29 within inconsistent exam and neg CTH/CTA/CTP. Personally reviewed all imagines, labs, EEG and other studies.    Impression:  1. 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  2. Transient left visual field cut and right facial droop: Nonlocalizing for CVA.   3. Chest pain  4. UTI    Recommendation:  - cvEEG  - hold antiseizure medication at this time  - if patient has an event: call me (8am-8pm: [phone]) or on-call Epilepsy fellow (8pm-8am: [phone]) to review EEG prior to giving any meds   - MRI brain w/o pending  - on abx  - Neurovascular and Cardiology following      Thank you for allowing Epilepsy to participate in the care of this patient.   ______________________  Gabriel Clarke MD   Director, Epilepsy/EMU - [facility]   Epilepsy Consult #: [id number] ([phone]) 
The patient is a 30y Female with questionable seizure activity.     Seizure.   Unclear if epileptic as no tongue biting/incontinence, however description is suspicious.   Suggest c-EEG monitoring for further evaluation.  Would not start antiseizure drug unless EEG with spike activity or if patient has further episodes suspicious for seizure.    Headache.   Currently satisfied with prn medication.    Case discussed with Dr Govea.      
31 year old female PMH of headaches presents with syncopal episodes ?cva vs TIA. Found to be tachycardic and having mid sternal CP described as sharp in nature without radiation, non re-producable, resolves when headache disappeared.

## 2022-08-29 NOTE — PROGRESS NOTE ADULT - SUBJECTIVE AND OBJECTIVE BOX
IMANI HERNANDEZ    [id number]    31y      Female    COde stroke was called around 11.30am for sudden loss of vision in left eye.  pt seen and examined a tbedsbise   Serbian speaking, RN helped with trnaslation   c/o b.lo upper hands numbness, b/l LE numbness.   sudden chest pain - sharp on left side.   headache         INTERVAL HPI/OVERNIGHT EVENTS: no acute evnets overnight      REVIEW OF SYSTEMS:    CONSTITUTIONAL: No fever  RESPIRATORY: No cough,-+ shortness of breath  CARDIOVASCULAR: +chest pain  GASTROINTESTINAL: No abdominal or epigastric pain. No nausea, vomiting  NEUROLOGICAL: +headaches,     Vital Signs Last 24 Hrs  T(C): 36.7 (29 Aug 2022 08:18), Max: 36.9 (28 Aug 2022 19:55)  T(F): 98.1 (29 Aug 2022 08:18), Max: 98.4 (28 Aug 2022 19:55)  HR: 85 (29 Aug 2022 11:20) (85 - 104)  BP: 119/83 (29 Aug 2022 11:20) (108/77 - 131/91)  BP(mean): --  RR: 19 (29 Aug 2022 11:20) (18 - 19)  SpO2: 100% (29 Aug 2022 11:20) (100% - 100%)    Parameters below as of 29 Aug 2022 11:20  Patient On (Oxygen Delivery Method): room air        PHYSICAL EXAM:    GENERAL: NAD, well-groomed  HEENT: PERRL, +EOMI  NECK: soft, Supple  CHEST/LUNG: Clear to percussion bilaterally; No wheezing  HEART: S1S2+, Regular rate and rhythm; No murmurs  ABDOMEN: Soft, Nontender, Nondistended; Bowel sounds present  EXTREMITIES:   No clubbing, cyanosis, or edema  SKIN: No rashes or lesions  NEURO: AAOX3       @ 07:  -   @ 15:09  --------------------------------------------------------  IN: 675 mL / OUT: 1 mL / NET: 674 mL        LABS:                        13.3   6.46  )-----------( 352      ( 29 Aug 2022 10:30 )             39.2         137  |  103  |  10.3  ----------------------------<  150<H>  3.4<L>   |  18.0<L>  |  0.79    Ca    9.7      29 Aug 2022 10:30  Phos  1.7       Mg     2.0         TPro  7.7  /  Alb  4.5  /  TBili  0.4  /  DBili  x   /  AST  21  /  ALT  16  /  AlkPhos  85      PT/INR - ( 29 Aug 2022 11:00 )   PT: 12.0 sec;   INR: 1.03 ratio         PTT - ( 29 Aug 2022 11:00 )  PTT:27.8 sec  Urinalysis Basic - ( 27 Aug 2022 23:30 )    Color: Yellow / Appearance: Clear / S.005 / pH: x  Gluc: x / Ketone: Negative  / Bili: Negative / Urobili: 1   Blood: x / Protein: Negative / Nitrite: Positive   Leuk Esterase: Trace / RBC: 3-5 /HPF / WBC 3-5 /HPF   Sq Epi: x / Non Sq Epi: Moderate / Bacteria: Many          MEDICATIONS  (STANDING):  heparin   Injectable 5000 Unit(s) SubCutaneous every 8 hours  potassium chloride  10 mEq/100 mL IVPB 10 milliEquivalent(s) IV Intermittent every 1 hour  sodium chloride 0.9%. 1000 milliLiter(s) (25 mL/Hr) IV Continuous <Continuous>  trimethoprim  160 mG/sulfamethoxazole 800 mG 1 Tablet(s) Oral two times a day    MEDICATIONS  (PRN):  nitroglycerin     SubLingual 0.4 milliGRAM(s) SubLingual every 5 minutes PRN Chest Pain      RADIOLOGY & ADDITIONAL TESTS:      < from: PACS Image (CT Angio Neck w/ IV Cont) (22 @ 10:58) >  Image(s) Available    < end of copied text >    < from: CT Brain Stroke Protocol (22 @ 10:48) >    ACC: [id number] EXAM:  CT ANGIO BRAIN (W)AW IC                        ACC: [id number] EXAM:  CT PERFUSION W MAPS IC                        ACC: [id number] EXAM:  CT BRAIN STROKE PROTOCOL                        ACC: [id number] EXAM:  CT ANGIO NECK (W)AW IC                        PROCEDURE DATE:  2022          INTERPRETATION:  CT HEAD STROKE PROTOCOL, CT ANGIO NECK WITHOUT AND OR   WITH IV CONTRAST, CT PERFUSION WITH MAPS WITH IV CONTRAST, CT ANGIO BRAIN   WITHOUT AND OR WITH IV CONTRAST    CLINICAL HISTORY: Code Stroke. Right facial weakness    CT HEAD TECHNIQUE:  Noncontrast CT.  Axial Acqisition.  Sagittal and coronal reformations.    COMPARISON:  Head CT is compared to prior study of 2022    FINDINGS:  *  HEMORRHAGE:  No evidence of intracranial hemorrhage.  *  BRAIN PARENCHYMA:  No abnormal regions of parenchymal attenuation. No   mass or mass effect.  *  VENTRICLES / SHIFT:  No hydrocephalus. No midline shift.  *  EXTRA-AXIAL / BASAL CISTERNS:  No extra-axial mass. Basal cisterns   preserved.  *  CALVARIUM AND EXTRACRANIAL SOFT TISSUES:  No depressed calvarial   fracture.  *  SINUSES, ORBITS, MASTOIDS:  The visualized paranasal sinuses and   mastoid air cells are well aerated.  ----------------------------------------    CTA TECHNIQUE:  CT angiography of the neck and brain was performed during the dynamic   administration of intravenous contrast.  MIP reconstructions were   performed and reviewed. Multiplanar reformations were obtained.  Contrast administered 70 cc Omnipaque 350, contrast discarded 0 cc    CTA FINDINGS:  NECK  RIGHT CAROTID CIRCULATION:  *  Evaluation of the right carotid circulation demonstrates no   hemodynamic significant narrowing utilizing a distal reference.  LEFT CAROTID CIRCULATION:  *  Evaluation of the left carotid circulation demonstrates no hemodynamic   significant narrowing utilizing a distal reference.  VERTEBRAL ARTERIES:  *  Evaluation of the vertebral arteries reveals no evidence of a   vertebral artery occlusion or dissection.    BRAIN  ANTERIOR CIRCULATION:  *  Distal internal carotid arteries are patent.  *  Anterior cerebral arteries are patent.  *  Middle cerebral arteries are patent.  POSTERIOR CIRCULATION:  *  Distal vertebral arteries are patent. Bilateral posteriorinferior   cerebellar arteries are seen  *  Basilar artery is patent. Proximal superior cerebellar arteries are   patent  *  Posterior cerebral arteries are patent.  --------------------------------------------------    CT PERFUSION TECHNIQUE:  CT perfusion was performed during the administration of intravenous   contrast.  There was a total of 8 cm brain coverage.  The images were processed utilizing RAPID software.  Contrast administered 50 cc Omnipaque 350, contrast discarded 0 cc    Ischemic tissue is defined as TMAX > 6 seconds.  Core infarct is defined as CBF < 30%.    CT PERFUSION FINDINGS:  *  There are no regions of hypoperfusion identified.  *  The volume of ischemic tissue (core infarct and penumbra) measures 0   mL.  *  The core infarct measures 0 mL.  *  The mismatch volume (penumbra) measure 0 mL.  *  The mismatch ratio (ischemic tissue / core) is: None  *  The hypoperfusion index (Tmax>10s/Tmax>6s) is: Not applicable      IMPRESSION:  *  NORMAL NONCONTRAST CT OF THE BRAIN  *  NO HEMODYNAMIC SIGNIFICANT NARROWING WITHIN THE NECK.  *  NO PROXIMAL LARGE VESSEL OCCLUSION INTRACRANIALLY.  *  NO AREAS OF ISCHEMIA IDENTIFIED ON PERFUSION IMAGING.    Findings discussed with Dr. Barlow at 11:21 AM on 2022    --- End of Report ---            DONA BLACKWELL MD; Attending Radiologist  This document has been electronically signed. Aug 29 2022 11:23AM    < end of copied text >      EKG - reviewed during RApid - sinus tachycardia

## 2022-08-29 NOTE — RAPID RESPONSE TEAM SUMMARY - NSMEDICATIONSRRT_GEN_ALL_CORE
-Aspirin 324mg chewable x 1 STAT  -NS 500cc bolus x 1 STAT  -Nitrostat 5mg sublingual h1bsttrol x 3 doses - ordered but not given secondary to patient's pain spontaneously improved.

## 2022-08-29 NOTE — RAPID RESPONSE TEAM SUMMARY - NSOTHERINTERVENTIONSRRT_GEN_ALL_CORE
-STAT EKG done and reviewed by MD bedside, sinus tachycardia appreciated without any additional abnormalities noted  -STAT CXR  -STAR CT Stroke Protocol; CTH, CTA H/N, CTP as per stroke protocol  -vEEG ordered since 8/28 - vEEG   -will need cardio consult  -VSq2  -Neurochecks q2   -Upgraded to SDU     The above patient and plan was d/w Dr. Govea, Dr. Barlow -stroke neurologist, RN and nurse management.  -STAT EKG done and reviewed by MD bedside, sinus tachycardia appreciated without any additional abnormalities noted  -STAT CXR  -STAT labs: CBC w/ diff, CMP, Mg, Phos, A1c, Trop, CK, Lipid panel, TSH, T&S, prolactin, lactate, procal  -STAR CT Stroke Protocol; CTH, CTA H/N, CTP as per stroke protocol  -vEEG ordered since 8/28 - vEEG   -will need cardio consult  -VSq2  -Neurochecks q2   -Upgraded to SDU     The above patient and plan was d/w Dr. Govea, Dr. Barlow -stroke neurologist, RN and nurse management.

## 2022-08-29 NOTE — CHART NOTE - NSCHARTNOTEFT_GEN_A_CORE
pt seen and examined at bedside   offers no complaints at this time, Bengali speaking,  services used over phone, mom at bedside who witnessed the event  h/o headaches   awake, alert, exam findings - normal   case discussed with neuro - EEG
2HR RRT Follow-Up:    Patient seen and examined at bedside, nursing assistant Yulisa provided translation per patient request.  Patient is appreciated to be resting comfortably in bed, NAD, VSS without complaints.  Patient adamantly denies: chest pain, palpitations, SOB, difficulty breathing, headache, abnormal changes in vision such as loss of vision, blurry vision, or diplopia, abnormal changes in speech such as aphasia or dysarthria, limb numbness / weakness/ paresis, lightheadedness, dizziness or weakness.     Vital Signs Last 24 Hrs  T(C): 36.7 (29 Aug 2022 08:18), Max: 37 (28 Aug 2022 13:05)  T(F): 98.1 (29 Aug 2022 08:18), Max: 98.6 (28 Aug 2022 13:05)  HR: 85 (29 Aug 2022 11:20) (85 - 104)  BP: 119/83 (29 Aug 2022 11:20) (108/77 - 131/91)  RR: 19 (29 Aug 2022 11:20) (18 - 19)  SpO2: 100% (29 Aug 2022 11:20) (100% - 100%)    Parameters below as of 29 Aug 2022 11:20  Patient On (Oxygen Delivery Method): room air                          13.3   6.46  )-----------( 352      ( 29 Aug 2022 10:30 )             39.2       08-29    137  |  103  |  10.3  ----------------------------<  150<H>  3.4<L>   |  18.0<L>  |  0.79    Ca    9.7      29 Aug 2022 10:30  Phos  1.7     08-29  Mg     2.0     08-29    TPro  7.7  /  Alb  4.5  /  TBili  0.4  /  DBili  x   /  AST  21  /  ALT  16  /  AlkPhos  85  08-29          PT/INR - ( 29 Aug 2022 11:00 )   PT: 12.0 sec;   INR: 1.03 ratio    PTT - ( 29 Aug 2022 11:00 )  PTT:27.8 sec    CARDIAC MARKERS ( 29 Aug 2022 10:30 )  x     / <0.01 ng/mL / 57 U/L / x     / x      CARDIAC MARKERS ( 27 Aug 2022 22:50 )  x     / <0.01 ng/mL / x     / x     / x      CARDIAC MARKERS ( 27 Aug 2022 19:40 )  x     / <0.01 ng/mL / x     / x     / x        CAPILLARY BLOOD GLUCOSE  144 (29 Aug 2022 11:48)    POCT Blood Glucose.: 144 mg/dL (29 Aug 2022 10:23)    < from: CT Brain Stroke Protocol (08.29.22 @ 10:48) >    CT Brain Stroke Protocol IMPRESSION:  *  NORMAL NONCONTRAST CT OF THE BRAIN  *  NO HEMODYNAMIC SIGNIFICANT NARROWING WITHIN THE NECK.  *  NO PROXIMAL LARGE VESSEL OCCLUSION INTRACRANIALLY.  *  NO AREAS OF ISCHEMIA IDENTIFIED ON PERFUSION IMAGING.    < end of copied text >    Physical Exam:  General: NAD, NCAT, resting comfortably in bed  Cardio: normal S1, S2, no MRG appreciated  Lungs: CTAB, no abnormal breath sounds appreciated, respirations unlabored on RA  Abdomen: soft, non-tender, non-distended, +BS  Extremities: no pedal edema appreciated b/l, negative Lorne's sign b/l, 2+ DP & PT b/l  Neuro: AOX4, sensation & strength intact distally, no facial droop / asymmetry / weakness / numbness appreciated, speech is clear and fluent without aphasia or dysarthria appreciated, no focal deficits appreciated     Plan:  -[facility] Cardio team at bedside, will order TTE with bubble study   -If MR Brain negative for stroke no further cardiac work-up recommended, if MR is positive then cardio team will recommend further w/u   -neurology team following   -A1c, Lipid panel and Thyroid function test ordered, results pending   -AM labs ordered   -vEEG is on, if patient is called for MRI -RN to please call [employer] for removal. (provider d/w [email or web address] tech)   -Vsq2  -Neuro checks q2  -RN to continue to monitor  -RN to call provider for any acute changes / questions / concerns  [phone]    The above patient and plan was d/w neuro & cardio teams, primary RN Mariaa and Dr. Govea.

## 2022-08-29 NOTE — RAPID RESPONSE TEAM SUMMARY - NSSITUATIONBACKGROUNDRRT_GEN_ALL_CORE
The patient is a 30y Female with questionable seizure activity, with a known history of complicated migraines. Patient admits she has never seen a neurologist secondary to not being able to find one with her insurance plan.  Per primary RN there was a concert RRT immediately upgraded to a code stroke for complete hemianopia appreciated on the right side.      The patient is a 30y Female with questionable seizure activity, with a known history of complicated migraines. Patient admits she has never seen a neurologist secondary to not being able to find one with her insurance plan.  Per primary RN there was a concern for possible facial droop earlier this morning at some time between 07:00 - 08:00, however, this was not reported to a provider until after am rounds at ~ 10:15. Patient immediately assessed by provider and complete neuro exam performed.  Provider did not appreciate any facial droop or asymmetry, however, provider did appreciate acute complete left hemianopia.  RRT immediately called and then immediately upgraded to a code stroke for acute complete hemianopia appreciated on the left side.      Code Stroke called immediately by provider for concern of acute complete hemianopia appreciated on the left side. Please see stroke note for additional information and results.      The patient is a 30y Female with questionable seizure activity, with a known history of complicated migraines. Patient admits she has never seen a neurologist secondary to not being able to find one with her insurance plan.  Per primary RN there was a concern for possible facial droop earlier this morning at some time between 07:00 - 08:00, however, this was not reported to a provider until after am rounds at ~ 10:15. Patient immediately assessed by provider and complete neuro exam performed.  Provider did not appreciate any facial droop or asymmetry, however, provider did appreciate acute complete left hemianopia.  RRT immediately called and then immediately upgraded to a code stroke for acute complete hemianopia appreciated on the left side.      Code Stroke called immediately by provider for concern of acute complete hemianopia appreciated on the left side. Please see stroke note for additional information and results.  Malay translation provided by nursing assistant Yulisa and RN Adriana per patient's request.  Patient requested medical staff to provide translation and refused  services.

## 2022-08-29 NOTE — CONSULT NOTE ADULT - PROBLEM SELECTOR RECOMMENDATION 9
-EKG shows sinus tachy  -? SVT in ambulance s/p adenosine  -Episodes may be induced by headaches  -Observe off BB  -TSH normal  -Hemodynamic stable

## 2022-08-29 NOTE — PROGRESS NOTE ADULT - ASSESSMENT
31 y/o f with pmh of headaches presents to [facility] ED after witnessed episode of seizure like activity presented to [facility] ED for witness seizure like activity. Rapid called on 8/29 for sudden loss of left eye vision, chest pain, CT head and angio - neg. low suspicion for Acute CVA. possible complex migraines - evaluated by neurologist - not a candidate for tPA 2/2 fluctuating and inconsistent neuro exam, MRI and EEG pending.       possible  seizure like activity .   - Family member witnessed pt with shaking, eyes rolling  - Pt does not recall this event. S/P keppra in ED   - CT imaging negative for acute infarcts  - Geovany consulted - EEG     sudden b/l eye vision loss - Rule out CVA/complex migraine vs other  - CT head and CT angio - neg - f/u MR head, ECHO with bubble study    chest pain -0 atypical - reproducible, trop - neg   cardio recs appreciated       UTI  - UA + for nitrites, bacteria  - Culture s- neg   - Bactrim DS started       Hx of headaches  -- tried meds in past       Supportive  - Heparin subq   - Diet    Dispo: As per mother, similar episodes happened to her mother when she got angry or emotional. Pt had an episode like this last year in [facility].

## 2022-08-29 NOTE — PROGRESS NOTE ADULT - PROVIDER SPECIALTY LIST ADULT
Pharmacokinetic Initial Assessment: IV Vancomycin    Assessment/Plan:    Initiate intravenous vancomycin with loading dose of 1500 mg once followed by a maintenance dose of vancomycin 1250mg IV every 12 hours  Desired empiric serum trough concentration is 15 to 20 mcg/mL  Draw vancomycin trough level 30 min prior to fourth dose on 6/9 at approximately 0930  Pharmacy will continue to follow and monitor vancomycin.      Please contact pharmacy at extension [phone] with any questions regarding this assessment.     Thank you for the consult,   Gilma Sandovalen       Patient brief summary:  Pratik Torres is a 23 y.o. male initiated on antimicrobial therapy with IV Vancomycin for treatment of suspected skin & soft tissue infection    Drug Allergies:   Review of patient's allergies indicates:   Allergen Reactions    Shrimp        Actual Body Weight:   83.9 kg    Renal Function:   Estimated Creatinine Clearance: 89.3 mL/min (based on SCr of 1.4 mg/dL).,     Dialysis Method (if applicable):  N/A    CBC (last 72 hours):  Recent Labs   Lab Result Units 06/07/20 2049   WBC K/uL 10.01   Hemoglobin g/dL 9.0*   Hematocrit % 26.8*   Platelets K/uL 316   Gran% % 62.0   Lymph% % 27.0   Mono% % 9.0   Eosinophil% % 2.0   Basophil% % 0.0   Differential Method  Manual       Metabolic Panel (last 72 hours):  Recent Labs   Lab Result Units 06/07/20 2049   Sodium mmol/L 131*   Potassium mmol/L 4.1   Chloride mmol/L 97   CO2 mmol/L 24   Glucose mg/dL 317*   BUN, Bld mg/dL 11   Creatinine mg/dL 1.4   Albumin g/dL 2.4*   Total Bilirubin mg/dL 0.3   Alkaline Phosphatase U/L 104   AST U/L 17   ALT U/L 6*       Drug levels (last 3 results):  No results for input(s): VANCOMYCINRA, VANCOMYCINPE, VANCOMYCINTR in the last 72 hours.    Microbiologic Results:  Microbiology Results (last 7 days)     Procedure Component Value Units Date/Time    Blood Culture #1 **CANNOT BE ORDERED STAT** [[id number]] Collected:  06/07/20 2049    Order Status:  Sent  Specimen:  Blood from Peripheral, Forearm, Left Updated:  06/08/20 0840    Blood Culture #2 **CANNOT BE ORDERED STAT** [[id number]] Collected:  06/07/20 2129    Order Status:  Sent Specimen:  Blood Updated:  06/08/20 0814         Epilepsy Neurology

## 2022-08-29 NOTE — CONSULT NOTE ADULT - SUBJECTIVE AND OBJECTIVE BOX
[facility]                                        Neurology at [place]                                  Tang Montgomery, & Hemanth                                      [address]                                           [place], [place], [address]                                                [phone]      Real name: Georgina Granados.    CC: headache and seizure.    HISTORY:  The patient is a 30y Female who reports a long history of chronic headache for many years.   She now presents after an episode of dizziness, chest pain, followed by generalized shaking and stiffness lasting a few minutes and resolving.   She had a similar prior episode in [facility] a year ago.    PAST MEDICAL & SURGICAL HISTORY:  No pertinent past medical history  No significant past surgical history    MEDICATION PRIOR TO ADMISSION:  None    MEDICATIONS  (STANDING):  heparin   Injectable 5000 Unit(s) SubCutaneous every 8 hours  trimethoprim  160 mG/sulfamethoxazole 800 mG 1 Tablet(s) Oral two times a day    Allergies  No Known Drug Allergies  pork (Other)    Intolerances  Penicillin    SOCIAL HISTORY:  Never smoker.   No alcohol.  From [facility] but living here since mid 2021.    FAMILY HISTORY:  Family history of diabetes mellitus (Mother)    ROS:  Constitutional: The patient denies fevers or weight changes.  Neuro: As per HPI.  Eyes: Denies blurry vision.  Ears/nose/throat: Denies Tinnitus.   Cardiac: Denies chest pain. Denies palpitations.  Respiratory: Denies shortness of breath.  GI: Denies abdominal pain, nausea, or vomiting.  : Denies change in urinary pattern.  Integumentary: Denies rash.  Psych: Denies recent mood changes.  Heme: denies easy bleeding/bruising.    Exam:  Vital Signs Last 24 Hrs  T(C): 36.8 (28 Aug 2022 09:07), Max: 37.1 (27 Aug 2022 19:25)  T(F): 98.3 (28 Aug 2022 09:07), Max: 98.8 (27 Aug 2022 19:25)  HR: 93 (28 Aug 2022 09:07) (92 - 126)  BP: 128/84 (28 Aug 2022 09:07) (110/77 - 128/84)  RR: 19 (28 Aug 2022 09:07) (19 - 20)  SpO2: 100% (28 Aug 2022 09:07) (98% - 100%)    Parameters below as of 28 Aug 2022 09:07  Patient On (Oxygen Delivery Method): room air    General: NAD.   Carotid bruits absent.     Mental status: The patient is awake, alert, and fully oriented. There is no aphasia. Attention span is normal. Patient is aware of current events.     Cranial nerves: There is no papilledema. Pupils react symmetrically to light. There is no visual field deficit to confrontation. Extraocular motion is full with no nystagmus.  Facial sensation is intact. Facial musculature is symmetric. Palate elevates symmetrically. Tongue is midline.    Motor: There is normal bulk and tone.  Strength is 5/5 in the right arm and leg.   Strength is 5/5 in the left arm and leg.    Sensation: Intact to light touch and pin. There is no extinction to double simultaneous stimulation.    Reflexes: 2+ throughout and plantar responses are flexor.    Cerebellar: There is no dysmetria on finger to nose testing.    LABS:                         12.4   7.89  )-----------( 351      ( 27 Aug 2022 19:40 )             36.4       08-27    137  |  104  |  9.2  ----------------------------<  115<H>  3.1<L>   |  14.0<L>  |  0.83    Ca    9.0      27 Aug 2022 19:40    TPro  7.2  /  Alb  4.2  /  TBili  0.5  /  DBili  x   /  AST  25  /  ALT  16  /  AlkPhos  80  08-27      RADIOLOGY   CT head images reviewed (and concur with report): There is no acute pathology.         
                                                                     [facility]                                                                     Gabriel Clarke MD                                              Epilepsy Consult #: [id number] ([phone])                                        Office: [address] [place] [address], 2nd floor, [place], [place], [address]                                                 Phone: [phone]; Fax: [phone]                            ==============================================    EPILEPSY INITIAL CONSULT NOTE      ADMITTING DIAGNOSIS: Syncope and collapse        HPI:  31 y/o f with pmh of headaches presents to [facility] ED after witnessed episode of seizure like activity. As per pt she recalls being at home in her room and felt a headache that has lasted for 2 days. Pt then felt her hands sweaty and tingly, and then closed her eyes and awoke in ER. As per her mother, she witnessed her daughter shaking and her eyes rolling to the back of her head. Pts mother states her mother would have episodes similar to this when she was angry. Pt had a previous episode in [facility] 1 year ago as well. Pt states she did begin a new medication for her chronic headache by her PCP but unsure of the name. Pt denies current chest pain, no headache, no sob, no palpitations, no nausea, vomiting, diarrhea, constipation ROS otherwise negative     ED course: S/P keppra given in ER. As per ED resident, pt was given adenosine (6mg, then 12) on her way in ambulance for presumed SVT, which was later reviewed by ER attending as sinus tachy. UA + for for nitrites and many bacteria. Lactate >6 on VBG , on repeat 2. (28 Aug 2022 01:06)      Brought to ED by family for seizure-like activity consisted of two episodes of eyes rolled back and full body shaking. Unresponsive to family, but did react to EMS' sternal rub. Patient's mother states that these episodes started in [facility], and they tend to occur when patient is angry. When EMS arrived, noted presumed SVT, so gave adenosine, but reviewed by ER attending as sinus tachycardia. Upon arrival to ER, staring and minimally responsive to questions; nodded yes when asked if she had chest pain. Received levetiracetam 1000mg in ER. UA consistent with UTI. This morning, Code Stroke called for left visual field cut and right facial droop. Also c/o CP. CTH/CTA/CTP unremarkable. Inconsistent exam per Neurovascular, Dr. Barlow.    PMH:  headache    PSH:  No significant past surgical history    MEDICATION:  heparin   Injectable 5000 Unit(s) SubCutaneous every 8 hours  nitroglycerin     SubLingual 0.4 milliGRAM(s) SubLingual every 5 minutes PRN Chest Pain  potassium chloride  10 mEq/100 mL IVPB 10 milliEquivalent(s) IV Intermittent every 1 hour  sodium chloride 0.9%. 1000 milliLiter(s) (25 mL/Hr) IV Continuous <Continuous>  trimethoprim  160 mG/sulfamethoxazole 800 mG 1 Tablet(s) Oral two times a day    ALLERGIES:  No Known Drug Allergies  pork (Other)    FH:  Family history of diabetes mellitus (Mother)    SH:  No EtOH, tobacco, illicit drugs.    ROS:  Neurology as per HPI, otherwise negative for constitutional, skin, eyes, ENT, respiratory, cardiovascular, gastrointestinal, genitourinary, musculoskeletal, psychiatric, hematology/lymphatics, endocrine, allergic/immunologic.    VITALS:  T(C): 37 (22 @ 16:03), Max: 37 (22 @ 16:03)  HR: 99 (22 @ 16:03) (85 - 104)  BP: 132/85 (22 @ 16:03) (108/77 - 132/85)  ABP: --  RR: 20 (22 @ 16:03) (18 - 20)  SpO2: 98% (22 @ 16:03) (98% - 100%)  CVP(cm H2O): --    GENERAL PHYSICAL EXAM:  GEN: no distress  HEENT: NCAT, OP clear  EYES: sclera white, conjunctiva clear, no nystagmus  NECK: supple  CV: RRR, no murmur     		  PULM: CTAB, no wheezing  ABD: soft, +BS, NT  EXT: peripheral pulse intact, no cyanosis  MSK: muscle tone and strength normal  SKIN: warm, dry    NEUROLOGICAL EXAM:  Mental Status  Orientation: awake and alert   Language: clear and fluent    Cranial Nerves  II: full visual fields intact   III, IV, VI: PERRL, EOMI  V, VII: facial sensation and movement intact and symmetric   VIII: hearing intact   IX, X: uvula midline, soft palate elevates normally   XI: BL shoulder shrug intact   XII: tongue midline    Motor  5/5 BUE and BLE                 Tone and bulk are normal in upper and lower limbs  No pronator drift    Sensation  Intact to light touch and pinprick in all 4 EXTs    Reflex  2+ in BL biceps and patella                                    Plantar responses downward bilaterally    Coordination  Normal FTN bilaterally    Gait  Deferred      LABS:                          13.3   6.46  )-----------( 352      ( 29 Aug 2022 10:30 )             39.2         137  |  103  |  10.3  ----------------------------<  150<H>  3.4<L>   |  18.0<L>  |  0.79    Ca    9.7      29 Aug 2022 10:30  Phos  1.7       Mg     2.0         TPro  7.7  /  Alb  4.5  /  TBili  0.4  /  DBili  x   /  AST  21  /  ALT  16  /  AlkPhos  85      CAPILLARY BLOOD GLUCOSE  144 (29 Aug 2022 11:48)      POCT Blood Glucose.: 144 mg/dL (29 Aug 2022 10:23)    LIVER FUNCTIONS - ( 29 Aug 2022 10:30 )  Alb: 4.5 g/dL / Pro: 7.7 g/dL / ALK PHOS: 85 U/L / ALT: 16 U/L / AST: 21 U/L / GGT: x           PT/INR - ( 29 Aug 2022 11:00 )   PT: 12.0 sec;   INR: 1.03 ratio         PTT - ( 29 Aug 2022 11:00 )  PTT:27.8 sec  Urinalysis Basic - ( 27 Aug 2022 23:30 )    Color: Yellow / Appearance: Clear / S.005 / pH: x  Gluc: x / Ketone: Negative  / Bili: Negative / Urobili: 1   Blood: x / Protein: Negative / Nitrite: Positive   Leuk Esterase: Trace / RBC: 3-5 /HPF / WBC 3-5 /HPF   Sq Epi: x / Non Sq Epi: Moderate / Bacteria: Many        OTHER IMAGING AND STUDIES:    CTH/CTA/CTP (22 @ 10:58) >  IMPRESSION:  *  NORMAL NONCONTRAST CT OF THE BRAIN  *  NO HEMODYNAMIC SIGNIFICANT NARROWING WITHIN THE NECK.  *  NO PROXIMAL LARGE VESSEL OCCLUSION INTRACRANIALLY.  *  NO AREAS OF ISCHEMIA IDENTIFIED ON PERFUSION IMAGING.    
                                             [facility]                                              CARDIOLOGY AT [place] [address]                                             Telephone: [phone]. Fax:[phone]                                                       CARDIOLOGY CONSULTATION NOTE                                                                                             History obtained by: Patient and medical record  Community Cardiologist: none   obtained: Yes [ x ] No [  ]  Reason for Consultation: r/o stroke  Available out pt records reviewed: Yes [  ] No [  ] none    Chief complaint:    Patient is a 31y old  Female who presents with a chief complaint of Seizure like activity (29 Aug 2022 10:41)      HPI:  29 y/o f with pmh of headaches presents to [facility] ED after witnessed episode of seizure like activity. As per pt she recalls being at home in her room and felt a headache that has lasted for 2 days. Pt then felt her hands sweaty and tingly, and then closed her eyes and awoke in ER. As per her mother, she witnessed her daughter shaking and her eyes rolling to the back of her head. Pts mother states her mother would have episodes similar to this when she was angry. Pt had a previous episode in [facility] 1 year ago as well. Pt states she did begin a new medication for her chronic headache by her PCP but unsure of the name.     Had an episode of posterior headache with acute vision loss on left side. Became tachycardic and has sharp CP, Pt currently denies chest pain/ sob/ palpitations          CARDIAC TESTING   ECHO:    STRESS:    CATH:     ELECTROPHYSIOLOGY:     PAST MEDICAL HISTORY  No pertinent past medical history        PAST SURGICAL HISTORY  No significant past surgical history        SOCIAL HISTORY:  Denies smoking/alcohol/drugs  CIGARETTES:     ALCOHOL:  DRUGS:    FAMILY HISTORY:  Family history of diabetes mellitus (Mother)  Family hx CVA in both mom and dad    Family History of Cardiovascular Disease:  Yes [  ] No [ x ]  Coronary Artery Disease in first degree relative: Yes [  ] No [x  ]  Sudden Cardiac Death in First degree relative: Yes [  ] No [ x ]    HOME MEDICATIONS:      CURRENT CARDIAC MEDICATIONS:  nitroglycerin     SubLingual 0.4 milliGRAM(s) SubLingual every 5 minutes, Stop order after: 3 Doses PRN Chest Pain      CURRENT OTHER MEDICATIONS:  heparin   Injectable 5000 Unit(s) SubCutaneous every 8 hours  potassium chloride  10 mEq/100 mL IVPB 10 milliEquivalent(s) IV Intermittent every 1 hour, Stop order after: 3 Doses  sodium chloride 0.9%. 1000 milliLiter(s) (25 mL/Hr) IV Continuous <Continuous>, Stop order after: 3 Hours  trimethoprim  160 mG/sulfamethoxazole 800 mG 1 Tablet(s) Oral two times a day      ALLERGIES:   No Known Drug Allergies  pork (Other)      REVIEW OF SYMPTOMS:   CONSTITUTIONAL: No fever, no chills, no weight loss, no weight gain, no fatigue   ENMT:  No vertigo; No sinus or throat pain  NECK: No pain or stiffness  CARDIOVASCULAR: No chest pain, no dyspnea, no syncope/presyncope, no palpitations, no dizziness, no Orthopnea, no Paroxsymal nocturnal dyspnea  RESPIRATORY: no Shortness of breath, no cough, no wheezing  : No dysuria, no hematuria   GI: No dark color stool, no nausea, no diarrhea, no constipation, no abdominal pain   NEURO: No headache, no slurred speech   MUSCULOSKELETAL: No joint pain or swelling; No muscle, back, or extremity pain  PSYCH: No agitation, no anxiety.    ALL OTHER REVIEW OF SYSTEMS ARE NEGATIVE.    VITAL SIGNS:  T(C): 36.7 (22 @ 08:18), Max: 36.9 (22 @ 19:55)  T(F): 98.1 (22 @ 08:18), Max: 98.4 (22 @ 19:55)  HR: 85 (22 @ 11:20) (85 - 104)  BP: 119/83 (22 @ 11:20) (108/77 - 131/91)  RR: 19 (22 @ 11:20) (18 - 19)  SpO2: 100% (22 @ 11:20) (100% - 100%)    INTAKE AND OUTPUT:       PHYSICAL EXAM:  Constitutional: Comfortable . No acute distress.   HEENT: Atraumatic and normocephalic , neck is supple . no JVD. No carotid bruit.  CNS: A&Ox3. No focal deficits.   Respiratory: CTAB, unlabored   Cardiovascular: RRR normal s1 s2. No murmur. No rubs or gallop.  Gastrointestinal: Soft, non-tender. +Bowel sounds.   Extremities: 2+ Peripheral Pulses, No clubbing, cyanosis, or edema  Psychiatric: Calm . no agitation.   Skin: Warm and dry, no ulcers on extremities     LABS:  ( 29 Aug 2022 10:30 )  Troponin T  <0.01,  CPK  57   , CKMB  X    , BNP X        , ( 27 Aug 2022 22:50 )  Troponin T  <0.01,  CPK  X    , CKMB  X    , BNP X        , ( 27 Aug 2022 19:40 )  Troponin T  <0.01,  CPK  X    , CKMB  X    , BNP X                                  13.3   6.46  )-----------( 352      ( 29 Aug 2022 10:30 )             39.2         137  |  103  |  10.3  ----------------------------<  150<H>  3.4<L>   |  18.0<L>  |  0.79    Ca    9.7      29 Aug 2022 10:30  Phos  1.7       Mg     2.0         TPro  7.7  /  Alb  4.5  /  TBili  0.4  /  DBili  x   /  AST  21  /  ALT  16  /  AlkPhos  85      PT/INR - ( 29 Aug 2022 11:00 )   PT: 12.0 sec;   INR: 1.03 ratio         PTT - ( 29 Aug 2022 11:00 )  PTT:27.8 sec  Urinalysis Basic - ( 27 Aug 2022 23:30 )    Color: Yellow / Appearance: Clear / S.005 / pH: x  Gluc: x / Ketone: Negative  / Bili: Negative / Urobili: 1   Blood: x / Protein: Negative / Nitrite: Positive   Leuk Esterase: Trace / RBC: 3-5 /HPF / WBC 3-5 /HPF   Sq Epi: x / Non Sq Epi: Moderate / Bacteria: Many      22 @ 10:30  CHolesterol: 192,  HDL: 46,  LDL: 88, Triglycerides: 292       Thyroid Stimulating Hormone, Serum: 2.39 uIU/mL (22 @ 10:30)      INTERPRETATION OF TELEMETRY: , up to 170s    ECG:   < from: 12 Lead ECG (22 @ 10:27) >  Diagnosis Line Sinus tachycardia with short RI  Nonspecific ST abnormality  Abnormal ECG    < end of copied text >    Prior ECG: Yes [  ] No [  ]    RADIOLOGY & ADDITIONAL STUDIES:    X-ray:    < from: Xray Chest 1 View- PORTABLE-Urgent (Xray Chest 1 View- PORTABLE-Urgent .) (22 @ 21:53) >  IMPRESSION:  Clear lungs.    < end of copied text >      CT scan:   < from: CT Angio Head w/ IV Cont (22 @ 10:53) >  CTA FINDINGS:  NECK  RIGHT CAROTID CIRCULATION:  *  Evaluation of the right carotid circulation demonstrates no   hemodynamic significant narrowing utilizing a distal reference.  LEFT CAROTID CIRCULATION:  *  Evaluation of the left carotid circulation demonstrates no hemodynamic   significant narrowing utilizing a distal reference.  VERTEBRAL ARTERIES:  *  Evaluation of the vertebral arteries reveals no evidence of a   vertebral artery occlusion or dissection.    BRAIN  ANTERIOR CIRCULATION:  *  Distal internal carotid arteries are patent.  *  Anterior cerebral arteries are patent.  *  Middle cerebral arteries are patent.  POSTERIOR CIRCULATION:  *  Distal vertebral arteries are patent. Bilateral posteriorinferior   cerebellar arteries are seen  *  Basilar artery is patent. Proximal superior cerebellar arteries are   patent  *  Posterior cerebral arteries are patent.    < end of copied text >    < from: CT Perfusion w/ Maps w/ IV Cont (22 @ 10:57) >  FINDINGS:  *  HEMORRHAGE:  No evidence of intracranial hemorrhage.  *  BRAIN PARENCHYMA:  No abnormal regions of parenchymal attenuation. No   mass or mass effect.  *  VENTRICLES / SHIFT:  No hydrocephalus. No midline shift.  *  EXTRA-AXIAL / BASAL CISTERNS:  No extra-axial mass. Basal cisterns   preserved.  *  CALVARIUM AND EXTRACRANIAL SOFT TISSUES:  No depressed calvarial   fracture.  *  SINUSES, ORBITS, MASTOIDS:  The visualized paranasal sinuses and   mastoid air cells are well aerated.    < end of copied text >    MRI:   US:

## 2022-08-30 LAB
-  AMIKACIN: SIGNIFICANT CHANGE UP
-  AMOXICILLIN/CLAVULANIC ACID: SIGNIFICANT CHANGE UP
-  AMPICILLIN/SULBACTAM: SIGNIFICANT CHANGE UP
-  AMPICILLIN: SIGNIFICANT CHANGE UP
-  AZTREONAM: SIGNIFICANT CHANGE UP
-  CEFAZOLIN: SIGNIFICANT CHANGE UP
-  CEFEPIME: SIGNIFICANT CHANGE UP
-  CEFOXITIN: SIGNIFICANT CHANGE UP
-  CEFTRIAXONE: SIGNIFICANT CHANGE UP
-  CIPROFLOXACIN: SIGNIFICANT CHANGE UP
-  ERTAPENEM: SIGNIFICANT CHANGE UP
-  GENTAMICIN: SIGNIFICANT CHANGE UP
-  IMIPENEM: SIGNIFICANT CHANGE UP
-  LEVOFLOXACIN: SIGNIFICANT CHANGE UP
-  MEROPENEM: SIGNIFICANT CHANGE UP
-  NITROFURANTOIN: SIGNIFICANT CHANGE UP
-  PIPERACILLIN/TAZOBACTAM: SIGNIFICANT CHANGE UP
-  TIGECYCLINE: SIGNIFICANT CHANGE UP
-  TOBRAMYCIN: SIGNIFICANT CHANGE UP
-  TRIMETHOPRIM/SULFAMETHOXAZOLE: SIGNIFICANT CHANGE UP
ALBUMIN SERPL ELPH-MCNC: 4.1 G/DL — SIGNIFICANT CHANGE UP (ref 3.3–5.2)
ALP SERPL-CCNC: 77 U/L — SIGNIFICANT CHANGE UP (ref 40–120)
ALT FLD-CCNC: 13 U/L — SIGNIFICANT CHANGE UP
ANION GAP SERPL CALC-SCNC: 11 MMOL/L — SIGNIFICANT CHANGE UP (ref 5–17)
AST SERPL-CCNC: 18 U/L — SIGNIFICANT CHANGE UP
BILIRUB SERPL-MCNC: 0.2 MG/DL — LOW (ref 0.4–2)
BUN SERPL-MCNC: 7.6 MG/DL — LOW (ref 8–20)
CALCIUM SERPL-MCNC: 8.8 MG/DL — SIGNIFICANT CHANGE UP (ref 8.4–10.5)
CHLORIDE SERPL-SCNC: 107 MMOL/L — SIGNIFICANT CHANGE UP (ref 98–107)
CO2 SERPL-SCNC: 20 MMOL/L — LOW (ref 22–29)
CREAT SERPL-MCNC: 0.73 MG/DL — SIGNIFICANT CHANGE UP (ref 0.5–1.3)
CULTURE RESULTS: SIGNIFICANT CHANGE UP
EGFR: 112 ML/MIN/1.73M2 — SIGNIFICANT CHANGE UP
GLUCOSE SERPL-MCNC: 103 MG/DL — HIGH (ref 70–99)
HCT VFR BLD CALC: 37.3 % — SIGNIFICANT CHANGE UP (ref 34.5–45)
HGB BLD-MCNC: 12.4 G/DL — SIGNIFICANT CHANGE UP (ref 11.5–15.5)
MCHC RBC-ENTMCNC: 30 PG — SIGNIFICANT CHANGE UP (ref 27–34)
MCHC RBC-ENTMCNC: 33.2 GM/DL — SIGNIFICANT CHANGE UP (ref 32–36)
MCV RBC AUTO: 90.1 FL — SIGNIFICANT CHANGE UP (ref 80–100)
METHOD TYPE: SIGNIFICANT CHANGE UP
ORGANISM # SPEC MICROSCOPIC CNT: SIGNIFICANT CHANGE UP
ORGANISM # SPEC MICROSCOPIC CNT: SIGNIFICANT CHANGE UP
PLATELET # BLD AUTO: 306 K/UL — SIGNIFICANT CHANGE UP (ref 150–400)
POTASSIUM SERPL-MCNC: 4.2 MMOL/L — SIGNIFICANT CHANGE UP (ref 3.5–5.3)
POTASSIUM SERPL-SCNC: 4.2 MMOL/L — SIGNIFICANT CHANGE UP (ref 3.5–5.3)
PROT SERPL-MCNC: 6.8 G/DL — SIGNIFICANT CHANGE UP (ref 6.6–8.7)
RBC # BLD: 4.14 M/UL — SIGNIFICANT CHANGE UP (ref 3.8–5.2)
RBC # FLD: 12.7 % — SIGNIFICANT CHANGE UP (ref 10.3–14.5)
SODIUM SERPL-SCNC: 138 MMOL/L — SIGNIFICANT CHANGE UP (ref 135–145)
SPECIMEN SOURCE: SIGNIFICANT CHANGE UP
WBC # BLD: 5.54 K/UL — SIGNIFICANT CHANGE UP (ref 3.8–10.5)
WBC # FLD AUTO: 5.54 K/UL — SIGNIFICANT CHANGE UP (ref 3.8–10.5)

## 2022-08-30 PROCEDURE — 95720 EEG PHY/QHP EA INCR W/VEEG: CPT

## 2022-08-30 PROCEDURE — 99233 SBSQ HOSP IP/OBS HIGH 50: CPT

## 2022-08-30 PROCEDURE — 93010 ELECTROCARDIOGRAM REPORT: CPT

## 2022-08-30 RX ORDER — ATORVASTATIN CALCIUM 80 MG/1
10 TABLET, FILM COATED ORAL AT BEDTIME
Refills: 0 | Status: DISCONTINUED | OUTPATIENT
Start: 2022-08-30 | End: 2022-08-31

## 2022-08-30 RX ORDER — ASPIRIN/CALCIUM CARB/MAGNESIUM 324 MG
81 TABLET ORAL DAILY
Refills: 0 | Status: DISCONTINUED | OUTPATIENT
Start: 2022-08-30 | End: 2022-08-31

## 2022-08-30 RX ADMIN — HEPARIN SODIUM 5000 UNIT(S): 5000 INJECTION INTRAVENOUS; SUBCUTANEOUS at 06:13

## 2022-08-30 RX ADMIN — HEPARIN SODIUM 5000 UNIT(S): 5000 INJECTION INTRAVENOUS; SUBCUTANEOUS at 21:21

## 2022-08-30 RX ADMIN — Medication 1 TABLET(S): at 06:11

## 2022-08-30 RX ADMIN — Medication 81 MILLIGRAM(S): at 13:00

## 2022-08-30 RX ADMIN — Medication 1 TABLET(S): at 17:36

## 2022-08-30 RX ADMIN — HEPARIN SODIUM 5000 UNIT(S): 5000 INJECTION INTRAVENOUS; SUBCUTANEOUS at 14:22

## 2022-08-30 RX ADMIN — ATORVASTATIN CALCIUM 10 MILLIGRAM(S): 80 TABLET, FILM COATED ORAL at 21:21

## 2022-08-30 NOTE — PROGRESS NOTE ADULT - ASSESSMENT
This is a 32yo RH female with a history of headaches who presented with seizure-like activity. Code Stroke called morning of 8/29 within inconsistent exam and neg CTH/CTA/CTP. Personally reviewed all imagines, labs, EEG and other studies.    Impression:  1. 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  2. Transient left visual field cut and right facial droop: Nonlocalizing for CVA.   3. Chest pain  4. UTI    Recommendation:  - cvEEG (EEG may come off at any time for MRI)  - hold antiseizure medication at this time  - if patient has an event: call me (8am-8pm: [phone]) or on-call Epilepsy fellow (8pm-8am: [phone]) to review EEG prior to giving any meds   - MRI brain w/o pending  - on abx  - Neurovascular and Cardiology following      Will continue to follow with you.   ______________________  Gabriel Clarke MD   Director, Epilepsy/EMU - [facility]   Epilepsy Consult #: [phone] ([phone])

## 2022-08-30 NOTE — PROGRESS NOTE ADULT - SUBJECTIVE AND OBJECTIVE BOX
[facility]                                                         CARDIOLOGY AT [facility]                                                                  [address], [place] [address]                                                         Telephone: [phone]. Fax:[phone]                                                                             PROGRESS NOTE    Reason for follow up: r/o stroke / syncope  Update: no seizure like activity / dizziness / chest pain . EEG still ongoing      Review of symptoms:   Cardiac:  No chest pain. No dyspnea. No palpitations.  Respiratory: no cough. No dyspnea  Gastrointestinal: No diarrhea. No abdominal pain. No bleeding.   Neuro: No focal neuro complaints.    Vitals:  T(C): 36.4 (08-30-22 @ 05:27), Max: 37 (08-29-22 @ 16:03)  HR: 99 (08-30-22 @ 05:27) (85 - 100)  BP: 113/76 (08-30-22 @ 05:27) (112/81 - 132/85)  RR: 18 (08-30-22 @ 05:27) (18 - 20)  SpO2: 99% (08-30-22 @ 05:27) (98% - 100%)  Wt(kg): --  I&O's Summary    29 Aug 2022 07:01  -  30 Aug 2022 07:00  --------------------------------------------------------  IN: 875 mL / OUT: 1 mL / NET: 874 mL      Weight (kg): 72.6 (08-27 @ 19:25)    PHYSICAL EXAM:  Appearance: Comfortable. No acute distress  HEENT:  Atraumatic. Normocephalic.  Normal oral mucosa  Neurologic: A & O x 3, no gross focal deficits.  Cardiovascular: RRR S1 S2, No murmur, no rubs/gallops. No JVD  Respiratory: Lungs clear to auscultation, unlabored   Gastrointestinal:  Soft, Non-tender, + BS  Lower Extremities: 2+ Peripheral Pulses, No clubbing, cyanosis, or edema  Psychiatry: Patient is calm. No agitation.   Skin: warm and dry.    CURRENT CARDIAC MEDICATIONS:  nitroglycerin     SubLingual 0.4 milliGRAM(s) SubLingual every 5 minutes PRN      CURRENT OTHER MEDICATIONS:  trimethoprim  160 mG/sulfamethoxazole 800 mG 1 Tablet(s) Oral two times a day  heparin   Injectable 5000 Unit(s) SubCutaneous every 8 hours  sodium chloride 0.9%. 1000 milliLiter(s) (25 mL/Hr) IV Continuous <Continuous>, Stop order after: 3 Hours      LABS:	 	  ( 29 Aug 2022 10:30 )  Troponin T  <0.01,  CPK  57   , CKMB  X    , BNP X        , ( 27 Aug 2022 22:50 )  Troponin T  <0.01,  CPK  X    , CKMB  X    , BNP X        , ( 27 Aug 2022 19:40 )  Troponin T  <0.01,  CPK  X    , CKMB  X    , BNP X                                  13.3   6.46  )-----------( 352      ( 29 Aug 2022 10:30 )             39.2     08-30    138  |  107  |  7.6<L>  ----------------------------<  103<H>  4.2   |  20.0<L>  |  0.73    Ca    8.8      30 Aug 2022 03:00  Phos  1.7     08-29  Mg     2.0     08-29    TPro  6.8  /  Alb  4.1  /  TBili  0.2<L>  /  DBili  x   /  AST  18  /  ALT  13  /  AlkPhos  77  08-30    PT/INR/PTT ( 29 Aug 2022 11:00 )                       :                       :      12.0         :       27.8                  .        .                   .              .           .       1.03        .                                       Lipid Profile: Date: 08-29 @ 10:30  Total cholesterol 192; Direct LDL: --; HDL: 46; Triglycerides:292    HgA1c:   TSH: Thyroid Stimulating Hormone, Serum: 2.39 uIU/mL  Thyroid Stimulating Hormone, Serum: 3.71 uIU/mL      TELEMETRY: NSR 70s  ECG:  < from: 12 Lead ECG (08.29.22 @ 10:27) >  Diagnosis Line Sinus tachycardia with short [place]  Nonspecific ST abnormality  Abnormal ECG    < end of copied text >      DIAGNOSTIC TESTING:  [ P] Echocardiogram: PENDING  [ ]  Catheterization:  [ ] Stress Test:    OTHER: 	                                                                [facility]                                                         CARDIOLOGY AT [facility]                                                                  [address], [place] [address]                                                         Telephone: [phone]. Fax:[phone]                                                                             PROGRESS NOTE    Reason for follow up: r/o stroke / syncope  Update: no seizure like activity / dizziness / chest pain . EEG still ongoing      Review of symptoms:   Cardiac:  No chest pain. No dyspnea. No palpitations.  Respiratory: no cough. No dyspnea  Gastrointestinal: No diarrhea. No abdominal pain. No bleeding.   Neuro: No focal neuro complaints.    Vitals:  T(C): 36.4 (08-30-22 @ 05:27), Max: 37 (08-29-22 @ 16:03)  HR: 99 (08-30-22 @ 05:27) (85 - 100)  BP: 113/76 (08-30-22 @ 05:27) (112/81 - 132/85)  RR: 18 (08-30-22 @ 05:27) (18 - 20)  SpO2: 99% (08-30-22 @ 05:27) (98% - 100%)  Wt(kg): --  I&O's Summary    29 Aug 2022 07:01  -  30 Aug 2022 07:00  --------------------------------------------------------  IN: 875 mL / OUT: 1 mL / NET: 874 mL      Weight (kg): 72.6 (08-27 @ 19:25)    PHYSICAL EXAM:  Appearance: Comfortable. No acute distress  HEENT:  Atraumatic. Normocephalic.  Normal oral mucosa  Neurologic: A & O x 3, no gross focal deficits.  Cardiovascular: RRR S1 S2, No murmur, no rubs/gallops. No JVD  Respiratory: Lungs clear to auscultation, unlabored   Gastrointestinal:  Soft, Non-tender, + BS  Lower Extremities: 2+ Peripheral Pulses, No clubbing, cyanosis, or edema  Psychiatry: Patient is calm. No agitation.   Skin: warm and dry.    CURRENT CARDIAC MEDICATIONS:  nitroglycerin     SubLingual 0.4 milliGRAM(s) SubLingual every 5 minutes PRN      CURRENT OTHER MEDICATIONS:  trimethoprim  160 mG/sulfamethoxazole 800 mG 1 Tablet(s) Oral two times a day  heparin   Injectable 5000 Unit(s) SubCutaneous every 8 hours  sodium chloride 0.9%. 1000 milliLiter(s) (25 mL/Hr) IV Continuous <Continuous>, Stop order after: 3 Hours      LABS:	 	  ( 29 Aug 2022 10:30 )  Troponin T  <0.01,  CPK  57   , CKMB  X    , BNP X        , ( 27 Aug 2022 22:50 )  Troponin T  <0.01,  CPK  X    , CKMB  X    , BNP X        , ( 27 Aug 2022 19:40 )  Troponin T  <0.01,  CPK  X    , CKMB  X    , BNP X                                  13.3   6.46  )-----------( 352      ( 29 Aug 2022 10:30 )             39.2     08-30    138  |  107  |  7.6<L>  ----------------------------<  103<H>  4.2   |  20.0<L>  |  0.73    Ca    8.8      30 Aug 2022 03:00  Phos  1.7     08-29  Mg     2.0     08-29    TPro  6.8  /  Alb  4.1  /  TBili  0.2<L>  /  DBili  x   /  AST  18  /  ALT  13  /  AlkPhos  77  08-30    PT/INR/PTT ( 29 Aug 2022 11:00 )                       :                       :      12.0         :       27.8                  .        .                   .              .           .       1.03        .                                       Lipid Profile: Date: 08-29 @ 10:30  Total cholesterol 192; Direct LDL: --; HDL: 46; Triglycerides:292    HgA1c:   TSH: Thyroid Stimulating Hormone, Serum: 2.39 uIU/mL  Thyroid Stimulating Hormone, Serum: 3.71 uIU/mL      TELEMETRY: NSR 70s  ECG:  < from: 12 Lead ECG (08.29.22 @ 10:27) >  Diagnosis Line Sinus tachycardia with short [place]  Nonspecific ST abnormality  Abnormal ECG    < end of copied text >      DIAGNOSTIC TESTING:  [ X] Echocardiogram:   < from: TTE Echo Complete w/ Contrast w/ Doppler (08.29.22 @ 18:25) >  Summary:   1. Left ventricular ejection fraction, by visual estimation, is 60 to   65%.   2. Normal global left ventricular systolic function.   3. The mitral in-flow pattern reveals no discernable A-wave, therefore   no comment on diastolic function can be made.   4. Normal left atrial size.   5. Normal right atrial size.   6. Mild mitral annular calcification.   7. Trace mitral valve regurgitation.   8. Endocardial visualization was enhanced with intravenous echo contrast.    < end of copied text >    [ ]  Catheterization:  [ ] Stress Test:    OTHER:

## 2022-08-30 NOTE — PROGRESS NOTE ADULT - SUBJECTIVE AND OBJECTIVE BOX
[facility]                                                                     Gabriel Clarke MD                                              Epilepsy Consult #: [phone] ([phone])                                        Office: [address] [place] [address], [address]nd floor, [place], [place], [address]                                                 Phone: [phone]; Fax: [phone]                            ==============================================    EPILEPSY FOLLOW-UP NOTE      INTERVAL HISTORY:  Continuous video EEG normal. No further seizure-like activity.    MEDICATIONS:   aspirin  chewable 81 milliGRAM(s) Oral daily  atorvastatin 10 milliGRAM(s) Oral at bedtime  heparin   Injectable 5000 Unit(s) SubCutaneous every 8 hours  nitroglycerin     SubLingual 0.4 milliGRAM(s) SubLingual every 5 minutes PRN Chest Pain  sodium chloride 0.9%. 1000 milliLiter(s) (25 mL/Hr) IV Continuous <Continuous>  trimethoprim  160 mG/sulfamethoxazole 800 mG 1 Tablet(s) Oral two times a day    LAST 24-HR VITALS:  T(C): 36.7 (08-30-22 @ 08:18), Max: 37 (08-29-22 @ 16:03)  HR: 100 (08-30-22 @ 08:18) (98 - 100)  BP: 118/84 (08-30-22 @ 08:18) (112/81 - 132/85)  ABP: --  RR: 18 (08-30-22 @ 08:18) (18 - 20)  SpO2: 99% (08-30-22 @ 08:18) (98% - 99%)  CVP(cm H2O): --    GENERAL PHYSICAL EXAM:  GEN: no distress   HEENT: NCAT, OP clear  EYES: sclera white, conjunctiva clear, no nystagmus  NECK: supple  CV: RRR, no murmur     		  PULM: CTAB, no wheezing  ABD: soft, +BS, NT  EXT: peripheral pulse intact, no cyanosis  MSK: muscle tone and strength normal  SKIN: warm, dry    NEUROLOGICAL EXAM:  Mental Status  Orientation: awake and alert  Language: clear     Cranial Nerves  II: full visual fields intact   III, IV, VI: PERRL, EOMI  V, VII: facial sensation and movement intact and symmetric   VIII: hearing intact   IX, X: uvula midline, soft palate elevates normally   XI: BL shoulder shrug intact   XII: tongue midline    Motor  5/5 BUE and BLE                 Tone and bulk are normal in upper and lower limbs  No pronator drift    Sensation  Intact to light touch and pinprick in all 4 EXTs    Reflex  2+ in BL biceps and patella                                    Plantar responses downward bilaterally    Coordination  Normal FTN bilaterally    Gait  Deferred       LABS:                          12.4   5.54  )-----------( 306      ( 30 Aug 2022 08:45 )             37.3     08-30    138  |  107  |  7.6<L>  ----------------------------<  103<H>  4.2   |  20.0<L>  |  0.73    Ca    8.8      30 Aug 2022 03:00  Phos  1.7     08-29  Mg     2.0     08-29    TPro  6.8  /  Alb  4.1  /  TBili  0.2<L>  /  DBili  x   /  AST  18  /  ALT  13  /  AlkPhos  77  08-30    CAPILLARY BLOOD GLUCOSE        LIVER FUNCTIONS - ( 30 Aug 2022 03:00 )  Alb: 4.1 g/dL / Pro: 6.8 g/dL / ALK PHOS: 77 U/L / ALT: 13 U/L / AST: 18 U/L / GGT: x           PT/INR - ( 29 Aug 2022 11:00 )   PT: 12.0 sec;   INR: 1.03 ratio         PTT - ( 29 Aug 2022 11:00 )  PTT:27.8 sec        OTHER IMAGING AND STUDIES:    non-elective EMU 8/29 -8/30/22:  EEG Summary:  Normal EEG in the awake, drowsy and asleep states.    Impression/Clinical Correlate:  8/29 – 8/30: no event or seizure    Normal video EEG in awake, drowsy and asleep states.      CTH/CTA/CTP (08.29.22 @ 10:58) >  IMPRESSION:  *  NORMAL NONCONTRAST CT OF THE BRAIN  *  NO HEMODYNAMIC SIGNIFICANT NARROWING WITHIN THE NECK.  *  NO PROXIMAL LARGE VESSEL OCCLUSION INTRACRANIALLY.  *  NO AREAS OF ISCHEMIA IDENTIFIED ON PERFUSION IMAGING.

## 2022-08-30 NOTE — PROGRESS NOTE ADULT - ASSESSMENT
31 year old female PMH of headaches presents with syncopal episodes ?cva vs TIA. Found to be tachycardic and having mid sternal CP described as sharp in nature without radiation, non re-producable, resolves when headache disappeared.

## 2022-08-30 NOTE — PROGRESS NOTE ADULT - SUBJECTIVE AND OBJECTIVE BOX
IMANI MONTOYAVIRY    [id number]    31y      Female      pt seen and examined a tbedsbise   Maori speaking,     denies any headache, feels okay, no other complaints, eating her breakfast          INTERVAL HPI/OVERNIGHT EVENTS: no acute events overnight      REVIEW OF SYSTEMS:    CONSTITUTIONAL: No fever  RESPIRATORY: No cough, no shortness of breath  CARDIOVASCULAR: +chest pain  GASTROINTESTINAL: No abdominal or epigastric pain. No nausea, vomiting  NEUROLOGICAL: +headaches,       Vital Signs Last 24 Hrs  T(C): 36.7 (30 Aug 2022 08:18), Max: 37 (29 Aug 2022 16:03)  T(F): 98 (30 Aug 2022 08:18), Max: 98.6 (29 Aug 2022 16:03)  HR: 100 (30 Aug 2022 08:18) (98 - 100)  BP: 118/84 (30 Aug 2022 08:18) (112/81 - 132/85)  BP(mean): --  RR: 18 (30 Aug 2022 08:18) (18 - 20)  SpO2: 99% (30 Aug 2022 08:18) (98% - 99%)    Parameters below as of 30 Aug 2022 08:18  Patient On (Oxygen Delivery Method): room air          PHYSICAL EXAM:    GENERAL: NAD, well-groomed  HEENT: PERRL, +EOMI  NECK: soft, Supple  CHEST/LUNG: Clear to percussion bilaterally; No wheezing  HEART: S1S2+, Regular rate and rhythm; No murmurs  ABDOMEN: Soft, Nontender, Nondistended; Bowel sounds present  EXTREMITIES:   No clubbing, cyanosis, or edema  SKIN: No rashes or lesions  NEURO: AAOX3  \        LABS:                                   12.4   5.54  )-----------( 306      ( 30 Aug 2022 08:45 )             37.3   08-30    138  |  107  |  7.6<L>  ----------------------------<  103<H>  4.2   |  20.0<L>  |  0.73    Ca    8.8      30 Aug 2022 03:00  Phos  1.7     08-29  Mg     2.0     08-29    TPro  6.8  /  Alb  4.1  /  TBili  0.2<L>  /  DBili  x   /  AST  18  /  ALT  13  /  AlkPhos  77  08-30      MEDICATIONS  (STANDING):  aspirin  chewable 81 milliGRAM(s) Oral daily  atorvastatin 10 milliGRAM(s) Oral at bedtime  heparin   Injectable 5000 Unit(s) SubCutaneous every 8 hours  sodium chloride 0.9%. 1000 milliLiter(s) (25 mL/Hr) IV Continuous <Continuous>  trimethoprim  160 mG/sulfamethoxazole 800 mG 1 Tablet(s) Oral two times a day    MEDICATIONS  (PRN):  nitroglycerin     SubLingual 0.4 milliGRAM(s) SubLingual every 5 minutes PRN Chest Pain        RADIOLOGY & ADDITIONAL TESTS:      < from: PACS Image (CT Angio Neck w/ IV Cont) (08.29.22 @ 10:58) >  Image(s) Available    < end of copied text >    < from: CT Brain Stroke Protocol (08.29.22 @ 10:48) >    ACC: [id number] EXAM:  CT ANGIO BRAIN (W)AW IC                        ACC: [id number] EXAM:  CT PERFUSION W MAPS IC                        ACC: [id number] EXAM:  CT BRAIN STROKE PROTOCOL                        ACC: [id number] EXAM:  CT ANGIO NECK (W)AW IC                        PROCEDURE DATE:  08/29/2022          INTERPRETATION:  CT HEAD STROKE PROTOCOL, CT ANGIO NECK WITHOUT AND OR   WITH IV CONTRAST, CT PERFUSION WITH MAPS WITH IV CONTRAST, CT ANGIO BRAIN   WITHOUT AND OR WITH IV CONTRAST    CLINICAL HISTORY: Code Stroke. Right facial weakness    CT HEAD TECHNIQUE:  Noncontrast CT.  Axial Acqisition.  Sagittal and coronal reformations.    COMPARISON:  Head CT is compared to prior study of 8/27/2022    FINDINGS:  *  HEMORRHAGE:  No evidence of intracranial hemorrhage.  *  BRAIN PARENCHYMA:  No abnormal regions of parenchymal attenuation. No   mass or mass effect.  *  VENTRICLES / SHIFT:  No hydrocephalus. No midline shift.  *  EXTRA-AXIAL / BASAL CISTERNS:  No extra-axial mass. Basal cisterns   preserved.  *  CALVARIUM AND EXTRACRANIAL SOFT TISSUES:  No depressed calvarial   fracture.  *  SINUSES, ORBITS, MASTOIDS:  The visualized paranasal sinuses and   mastoid air cells are well aerated.  ----------------------------------------    CTA TECHNIQUE:  CT angiography of the neck and brain was performed during the dynamic   administration of intravenous contrast.  MIP reconstructions were   performed and reviewed. Multiplanar reformations were obtained.  Contrast administered 70 cc Omnipaque 350, contrast discarded 0 cc    CTA FINDINGS:  NECK  RIGHT CAROTID CIRCULATION:  *  Evaluation of the right carotid circulation demonstrates no   hemodynamic significant narrowing utilizing a distal reference.  LEFT CAROTID CIRCULATION:  *  Evaluation of the left carotid circulation demonstrates no hemodynamic   significant narrowing utilizing a distal reference.  VERTEBRAL ARTERIES:  *  Evaluation of the vertebral arteries reveals no evidence of a   vertebral artery occlusion or dissection.    BRAIN  ANTERIOR CIRCULATION:  *  Distal internal carotid arteries are patent.  *  Anterior cerebral arteries are patent.  *  Middle cerebral arteries are patent.  POSTERIOR CIRCULATION:  *  Distal vertebral arteries are patent. Bilateral posteriorinferior   cerebellar arteries are seen  *  Basilar artery is patent. Proximal superior cerebellar arteries are   patent  *  Posterior cerebral arteries are patent.  --------------------------------------------------    CT PERFUSION TECHNIQUE:  CT perfusion was performed during the administration of intravenous   contrast.  There was a total of 8 cm brain coverage.  The images were processed utilizing RAPID software.  Contrast administered 50 cc Omnipaque 350, contrast discarded 0 cc    Ischemic tissue is defined as TMAX > 6 seconds.  Core infarct is defined as CBF < 30%.    CT PERFUSION FINDINGS:  *  There are no regions of hypoperfusion identified.  *  The volume of ischemic tissue (core infarct and penumbra) measures 0   mL.  *  The core infarct measures 0 mL.  *  The mismatch volume (penumbra) measure 0 mL.  *  The mismatch ratio (ischemic tissue / core) is: None  *  The hypoperfusion index (Tmax>10s/Tmax>6s) is: Not applicable      IMPRESSION:  *  NORMAL NONCONTRAST CT OF THE BRAIN  *  NO HEMODYNAMIC SIGNIFICANT NARROWING WITHIN THE NECK.  *  NO PROXIMAL LARGE VESSEL OCCLUSION INTRACRANIALLY.  *  NO AREAS OF ISCHEMIA IDENTIFIED ON PERFUSION IMAGING.    Findings discussed with Dr. Barlow at 11:21 AM on 8/29/2022    --- End of Report ---            DONA BLACKWELL MD; Attending Radiologist  This document has been electronically signed. Aug 29 2022 11:23AM    < end of copied text >      EKG - reviewed during RApid - sinus tachycardia

## 2022-08-30 NOTE — PROGRESS NOTE ADULT - PROBLEM SELECTOR PLAN 1
-EKG shows sinus tachy  -? SVT in ambulance s/p adenosine  -Episodes may be induced by headaches  -Observe off BB  -TSH normal  -Hemodynamic stable.  -No tachy overnight

## 2022-08-30 NOTE — PROGRESS NOTE ADULT - PROBLEM SELECTOR PLAN 2
Trops neg x3  -No acute coronary CP, episode during RRT doesn't fit ACS picture. Only occurs with ?syncope / headache spells. No prior cardiac history  -C/w ASA 81 daily  -Obtain TTE (ordered) -> performed pending official read Trops neg x3  -No acute coronary CP, episode during RRT doesn't fit ACS picture. Only occurs with ?syncope / headache spells. No prior cardiac history  -C/w ASA 81 daily  -TTE EF 60-65% Trops neg x3  -No acute coronary CP, episode during RRT doesn't fit ACS picture. Only occurs with ?syncope / headache spells. No prior cardiac history  -C/w ASA 81 daily  -TTE EF 60-65%, pend bubble study Trops neg x3  -No acute coronary CP, episode during RRT doesn't fit ACS picture. Only occurs with ?syncope / headache spells. No prior cardiac history  -C/w ASA 81 daily  -TTE EF 60-65%,  bubble study neg

## 2022-08-30 NOTE — PROGRESS NOTE ADULT - PROBLEM SELECTOR PLAN 3
-Obtain TTE with bubble study  -CTH / neck normal  -F/u EEG (still in progress)  -MR head pend. -Obtain TTE limited with bubble study (ordered)  -CTH / neck normal  -F/u EEG (still in progress)  -MR head pend. -Obtain TTE limited with bubble study (ordered)  -CTH / neck normal  -EEG neg  -MR head pend.

## 2022-08-30 NOTE — EEG REPORT - NS EEG TEXT BOX
[facility]  Epilepsy Monitoring Unit Report    [facility]: [address], [place] [address], Phone [phone]  [place] Office: [address], [address], [place] [address] Phone [phone]    [facility]: [address] [place] [address], Phone [phone]  [place] Office: [address] [place] [address], Phone [phone]    Patient Name: Georgina Baeza    Age: 31 year, : 1990  Patient ID: -, MRN #: -, [place]: -    Physician Ordering Inpatient EEG: Morro Vivas  Referral Source to EMU: non-elective admission – ER    EMU Study Started: 14:02 on 22    EMU Study Ended: pending discharge    Study Information:    EEG Recording Technique:  The patient underwent continuous Video-EEG monitoring, using Telemetry System hardware on the XLTek Digital System. EEG and video data were stored on a computer hard drive with important events saved in digital archive files. The material was reviewed by a physician (electroencephalographer / epileptologist) on a daily basis. Petesron and seizure detection algorithms were utilized and reviewed. An EEG Technician attended to the patient, and was available throughout daytime work hours.  The epilepsy center neurologist was available in person or on call 24-hours per day.    EEG Placement and Labeling of Electrodes:  The EEG was performed utilizing 20 channel referential EEG connections (coronal over temporal over parasagittal montage) using all standard 10-20 electrode placements with EKG, with additional electrodes placed in the inferior temporal region using the modified 10-10 montage electrode placements for elective admissions, or if deemed necessary. Recording was at a sampling rate of 256 samples per second per channel. Time synchronized digital video recording was done simultaneously with EEG recording. A low light infrared camera was used for low light recording.               History:  This is a 32yo RH female with a history of headaches who presented with seizure-like activity. Code Stroke called morning of  within inconsistent exam and neg CTH/CTA/CTP.     Brought to ED by family for seizure-like activity consisted of two episodes of eyes rolled back and full body shaking. Unresponsive to family, but did react to EMS' sternal rub. Patient's mother states that these episodes started in Health system, and they tend to occur when patient is angry. When EMS arrived, noted presumed SVT, so gave adenosine, but reviewed by ER attending as sinus tachycardia. Upon arrival to ER, staring and minimally responsive to questions; nodded yes when asked if she had chest pain. Received levetiracetam 1000mg in ER. UA consistent with UTI. This morning, Code Stroke called for left visual field cut and right facial droop. Also c/o CP. CTH/CTA/CTP unremarkable. Inconsistent exam per Neurovascular, Dr. Barlow.    Home Antiseizure Medication and Device  none    Interpretation:    Start Date: 2022 – Day 1                                Start Time – 14:02       Duration – 15h 36m    Daily EEG Visual Analysis  Findings: The background was continuous and reactive. During wakefulness, the posterior dominant rhythm consisted of symmetric, well-modulated 11 Hz activity, with amplitude to 50 uV, that attenuated to eye opening.     Background Slowing:  No generalized background slowing was present.    Focal Slowing:   None were present.    Sleep Background:  Drowsiness was characterized by fragmentation, attenuation, and slowing of the background activity.    Sleep was characterized by the presence of vertex waves, symmetric sleep spindles and K-complexes.    Other Non-Epileptiform Findings:  None were present.    Interictal Epileptiform Activity:   None were present.    Events:  No events or seizures recorded.    Artifacts:  Intermittent myogenic and movement artifacts were noted.    ECG:  The heart rate on single channel ECG was predominantly between 80-90 BPM.    ASM:  none    EEG Summary:  Normal EEG in the awake, drowsy and asleep states.    Impression/Clinical Correlate:   – : no event or seizure    Normal video EEG in awake, drowsy and asleep states.    ________________________________________    Gabriel Clarke MD  Director, Epilepsy/EMU - [facility]

## 2022-08-30 NOTE — PROGRESS NOTE ADULT - ASSESSMENT
29 y/o f with pmh of headaches presents to [facility] ED after witnessed episode of seizure like activity presented to [facility] ED for witness seizure like activity. Rapid/Code stroke was called on 8/29 for sudden loss of left eye vision, chest pain, CT head and angio - neg. low suspicion for Acute CVA. possible complex migraines - evaluated by neurologist - not a candidate for tPA 2/2 fluctuating and inconsistent neuro exam, MRI and EEG pending.       possible  seizure like activity .   - Family member witnessed pt with shaking, eyes rolling  - Pt does not recall this event. S/P keppra in ED   - CT imaging negative for acute infarcts  MR Hea d- pending   ECHO  - Bubble study pending   - Geovany consulted - EEG - to be continued today - may be PNES     sudden b/l eye vision loss - Rule out CVA/complex migraine vs other  - CT head and CT angio - neg - f/u MR head, ECHO with bubble study   ct aspirin and statin for now - DC if mRI negative - as discussed with NEuro     DYsllipidemia - will benefit from LSM    chest pain -0 atypical - reproducible, trop - neg   cardio following - ECHO bubble pending       Klebsiella TI  - Bactrim DS - f/u Sensitivities         Hx of headaches  -- tried meds in past       Supportive  - Heparin subq   - Diet    Dispo: Home pending Final EEG, echo and MR head

## 2022-08-31 ENCOUNTER — TRANSCRIPTION ENCOUNTER (OUTPATIENT)
Age: 32
End: 2022-08-31

## 2022-08-31 VITALS
RESPIRATION RATE: 20 BRPM | SYSTOLIC BLOOD PRESSURE: 108 MMHG | HEART RATE: 101 BPM | TEMPERATURE: 98 F | OXYGEN SATURATION: 98 % | DIASTOLIC BLOOD PRESSURE: 75 MMHG

## 2022-08-31 LAB
ANION GAP SERPL CALC-SCNC: 13 MMOL/L — SIGNIFICANT CHANGE UP (ref 5–17)
BUN SERPL-MCNC: 8.1 MG/DL — SIGNIFICANT CHANGE UP (ref 8–20)
CALCIUM SERPL-MCNC: 9.1 MG/DL — SIGNIFICANT CHANGE UP (ref 8.4–10.5)
CHLORIDE SERPL-SCNC: 104 MMOL/L — SIGNIFICANT CHANGE UP (ref 98–107)
CO2 SERPL-SCNC: 22 MMOL/L — SIGNIFICANT CHANGE UP (ref 22–29)
CREAT SERPL-MCNC: 0.78 MG/DL — SIGNIFICANT CHANGE UP (ref 0.5–1.3)
EGFR: 103 ML/MIN/1.73M2 — SIGNIFICANT CHANGE UP
GLUCOSE SERPL-MCNC: 104 MG/DL — HIGH (ref 70–99)
HCT VFR BLD CALC: 37.7 % — SIGNIFICANT CHANGE UP (ref 34.5–45)
HGB BLD-MCNC: 12.5 G/DL — SIGNIFICANT CHANGE UP (ref 11.5–15.5)
MCHC RBC-ENTMCNC: 29.8 PG — SIGNIFICANT CHANGE UP (ref 27–34)
MCHC RBC-ENTMCNC: 33.2 GM/DL — SIGNIFICANT CHANGE UP (ref 32–36)
MCV RBC AUTO: 90 FL — SIGNIFICANT CHANGE UP (ref 80–100)
PLATELET # BLD AUTO: 319 K/UL — SIGNIFICANT CHANGE UP (ref 150–400)
POTASSIUM SERPL-MCNC: 4 MMOL/L — SIGNIFICANT CHANGE UP (ref 3.5–5.3)
POTASSIUM SERPL-SCNC: 4 MMOL/L — SIGNIFICANT CHANGE UP (ref 3.5–5.3)
RBC # BLD: 4.19 M/UL — SIGNIFICANT CHANGE UP (ref 3.8–5.2)
RBC # FLD: 12.6 % — SIGNIFICANT CHANGE UP (ref 10.3–14.5)
SODIUM SERPL-SCNC: 139 MMOL/L — SIGNIFICANT CHANGE UP (ref 135–145)
WBC # BLD: 7.31 K/UL — SIGNIFICANT CHANGE UP (ref 3.8–10.5)
WBC # FLD AUTO: 7.31 K/UL — SIGNIFICANT CHANGE UP (ref 3.8–10.5)

## 2022-08-31 PROCEDURE — 86900 BLOOD TYPING SEROLOGIC ABO: CPT

## 2022-08-31 PROCEDURE — 86901 BLOOD TYPING SEROLOGIC RH(D): CPT

## 2022-08-31 PROCEDURE — 85027 COMPLETE CBC AUTOMATED: CPT

## 2022-08-31 PROCEDURE — 84145 PROCALCITONIN (PCT): CPT

## 2022-08-31 PROCEDURE — 83605 ASSAY OF LACTIC ACID: CPT

## 2022-08-31 PROCEDURE — 86850 RBC ANTIBODY SCREEN: CPT

## 2022-08-31 PROCEDURE — C8929: CPT

## 2022-08-31 PROCEDURE — 99285 EMERGENCY DEPT VISIT HI MDM: CPT

## 2022-08-31 PROCEDURE — 84443 ASSAY THYROID STIM HORMONE: CPT

## 2022-08-31 PROCEDURE — 70498 CT ANGIOGRAPHY NECK: CPT

## 2022-08-31 PROCEDURE — 99239 HOSP IP/OBS DSCHRG MGMT >30: CPT

## 2022-08-31 PROCEDURE — 36415 COLL VENOUS BLD VENIPUNCTURE: CPT

## 2022-08-31 PROCEDURE — 87186 SC STD MICRODIL/AGAR DIL: CPT

## 2022-08-31 PROCEDURE — 95718 EEG PHYS/QHP 2-12 HR W/VEEG: CPT

## 2022-08-31 PROCEDURE — 93005 ELECTROCARDIOGRAM TRACING: CPT

## 2022-08-31 PROCEDURE — 82550 ASSAY OF CK (CPK): CPT

## 2022-08-31 PROCEDURE — 83735 ASSAY OF MAGNESIUM: CPT

## 2022-08-31 PROCEDURE — 82962 GLUCOSE BLOOD TEST: CPT

## 2022-08-31 PROCEDURE — 70496 CT ANGIOGRAPHY HEAD: CPT

## 2022-08-31 PROCEDURE — 0042T: CPT

## 2022-08-31 PROCEDURE — 70450 CT HEAD/BRAIN W/O DYE: CPT

## 2022-08-31 PROCEDURE — 95700 EEG CONT REC W/VID EEG TECH: CPT

## 2022-08-31 PROCEDURE — 85730 THROMBOPLASTIN TIME PARTIAL: CPT

## 2022-08-31 PROCEDURE — 94760 N-INVAS EAR/PLS OXIMETRY 1: CPT

## 2022-08-31 PROCEDURE — 71045 X-RAY EXAM CHEST 1 VIEW: CPT

## 2022-08-31 PROCEDURE — 87086 URINE CULTURE/COLONY COUNT: CPT

## 2022-08-31 PROCEDURE — 0225U NFCT DS DNA&RNA 21 SARSCOV2: CPT

## 2022-08-31 PROCEDURE — 85025 COMPLETE CBC W/AUTO DIFF WBC: CPT

## 2022-08-31 PROCEDURE — 81001 URINALYSIS AUTO W/SCOPE: CPT

## 2022-08-31 PROCEDURE — 95813 EEG EXTND MNTR 61-119 MIN: CPT

## 2022-08-31 PROCEDURE — 95714 VEEG EA 12-26 HR UNMNTR: CPT

## 2022-08-31 PROCEDURE — 85610 PROTHROMBIN TIME: CPT

## 2022-08-31 PROCEDURE — 99233 SBSQ HOSP IP/OBS HIGH 50: CPT

## 2022-08-31 PROCEDURE — 70551 MRI BRAIN STEM W/O DYE: CPT

## 2022-08-31 PROCEDURE — 95711 VEEG 2-12 HR UNMONITORED: CPT

## 2022-08-31 PROCEDURE — 84100 ASSAY OF PHOSPHORUS: CPT

## 2022-08-31 PROCEDURE — 80048 BASIC METABOLIC PNL TOTAL CA: CPT

## 2022-08-31 PROCEDURE — 87077 CULTURE AEROBIC IDENTIFY: CPT

## 2022-08-31 PROCEDURE — 70551 MRI BRAIN STEM W/O DYE: CPT | Mod: 26

## 2022-08-31 PROCEDURE — 84484 ASSAY OF TROPONIN QUANT: CPT

## 2022-08-31 PROCEDURE — 80061 LIPID PANEL: CPT

## 2022-08-31 PROCEDURE — 80053 COMPREHEN METABOLIC PANEL: CPT

## 2022-08-31 PROCEDURE — 83036 HEMOGLOBIN GLYCOSYLATED A1C: CPT

## 2022-08-31 RX ORDER — METOPROLOL TARTRATE 50 MG
25 TABLET ORAL DAILY
Refills: 0 | Status: DISCONTINUED | OUTPATIENT
Start: 2022-08-31 | End: 2022-08-31

## 2022-08-31 RX ORDER — SODIUM CHLORIDE 9 MG/ML
1000 INJECTION INTRAMUSCULAR; INTRAVENOUS; SUBCUTANEOUS
Refills: 0 | Status: DISCONTINUED | OUTPATIENT
Start: 2022-08-31 | End: 2022-08-31

## 2022-08-31 RX ORDER — METOPROLOL TARTRATE 50 MG
1 TABLET ORAL
Qty: 30 | Refills: 0
Start: 2022-08-31 | End: 2022-09-29

## 2022-08-31 RX ADMIN — SODIUM CHLORIDE 50 MILLILITER(S): 9 INJECTION INTRAMUSCULAR; INTRAVENOUS; SUBCUTANEOUS at 13:27

## 2022-08-31 RX ADMIN — Medication 81 MILLIGRAM(S): at 13:26

## 2022-08-31 RX ADMIN — HEPARIN SODIUM 5000 UNIT(S): 5000 INJECTION INTRAVENOUS; SUBCUTANEOUS at 13:26

## 2022-08-31 RX ADMIN — HEPARIN SODIUM 5000 UNIT(S): 5000 INJECTION INTRAVENOUS; SUBCUTANEOUS at 05:41

## 2022-08-31 RX ADMIN — Medication 25 MILLIGRAM(S): at 13:26

## 2022-08-31 RX ADMIN — Medication 1 TABLET(S): at 05:41

## 2022-08-31 NOTE — DISCHARGE NOTE NURSING/CASE MANAGEMENT/SOCIAL WORK - NSDCPEFALRISK_GEN_ALL_CORE
For information on Fall & Injury Prevention, visit: [email or web address] OR  [email or web address] OR  https://www.cdc.gov/steadi/patient.html

## 2022-08-31 NOTE — PROGRESS NOTE ADULT - NS ATTEND AMEND GEN_ALL_CORE FT
Sinsu tachy. uncelar cause.  treat the underlyiong cause infection  No further in-patient cardiac work-up/management is needed.  Follow-up in cardiology office in 2 weeks.
Loss of consiousnes.   ariel non cardiac. no significnat events on telemetry.  Just tachycardic.    Ucnelar cause of tachycarida. Transthoracic echocardiogram pending.   Chest pain on deep ions[piration    outpatient 4 weeks event monitor.   ESR and CRP check . will follow up MRi for any stroke.   No further in-patient cardiac work-up/management is needed.  Follow-up in cardiology office in 2 weeks.

## 2022-08-31 NOTE — PROGRESS NOTE ADULT - PROBLEM SELECTOR PLAN 1
-EKG shows sinus tachy  -? SVT in ambulance s/p adenosine  -Episodes may be induced by headaches  -Observe off BB  -TSH normal  -Hemodynamic stable.  -Tachy up to 150 overnight  -4 week outpatient monitor -EKG shows sinus tachy  -? SVT in ambulance s/p adenosine  -Episodes may be induced by headaches  -Observe off BB  -TSH normal  -Hemodynamic stable.  -Tachy up to 150 overnight, fluids started and toprol XL  -4 week outpatient monitor

## 2022-08-31 NOTE — PROGRESS NOTE ADULT - ASSESSMENT
This is a 30yo RH female with a history of headaches who presented with seizure-like activity. Code Stroke called morning of 8/29 within inconsistent exam and neg CTH/CTA/CTP. Personally reviewed all imagines, labs, EEG and other studies.    Impression:  1. 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  2. Transient left visual field cut and right facial droop: Nonlocalizing for CVA.   3. Chest pain  4. UTI    Recommendation:  - cvEEG (EEG may come off at any time for MRI)  - hold antiseizure medication at this time  - if patient has an event: call me (8am-8pm: [phone]) or on-call Epilepsy fellow (8pm-8am: [phone]) to review EEG prior to giving any meds   - MRI brain w/o pending  - on abx  - Neurovascular and Cardiology following      Will continue to follow with you.   ______________________  Gabriel Clarke MD   Director, Epilepsy/EMU - [facility]   Epilepsy Consult #: [phone] ([phone])  This is a 30yo RH female with a history of headaches who presented with seizure-like activity. Code Stroke called morning of 8/29 within inconsistent exam and neg CTH/CTA/CTP. Personally reviewed all imagines, labs, EEG and other studies.    Impression:  1. 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  2. Transient left visual field cut and right facial droop: Nonlocalizing for CVA. MRI brain normal.  3. Chest pain  4. UTI    Recommendation:  - discontinue cvEEG  - no indication for antiseizure medication at this time  - recommend outpt cognitive behavioral therapy (CBT)   - on abx  - Neurovascular and Cardiology following      No acute intervention from Epilepsy at this time.  Please reconsult if needed.   ______________________  Gabriel Clarke MD   Director, Epilepsy/EMU - [facility]   Epilepsy Consult #: [phone] ([phone])

## 2022-08-31 NOTE — PROGRESS NOTE ADULT - SUBJECTIVE AND OBJECTIVE BOX
[facility]                                                         CARDIOLOGY AT [facility]                                                                  [address], [place] [place] [address]                                                         Telephone: [phone]. Fax:[phone]                                                                             PROGRESS NOTE    Reason for follow up: r/o stroke / syncope  Update: no seizure like activity / dizziness. Has reproducible chest pain left chest wall mid clavicular      Review of symptoms:   Cardiac: + chest pain. No dyspnea. No palpitations.  Respiratory: no cough. No dyspnea  Gastrointestinal: No diarrhea. No abdominal pain. No bleeding.   Neuro: No focal neuro complaints.    Vitals:  T(C): 36.4 (08-31-22 @ 05:13), Max: 36.8 (08-30-22 @ 15:25)  HR: 100 (08-31-22 @ 05:13) (75 - 104)  BP: 120/81 (08-31-22 @ 05:13) (117/84 - 126/68)  RR: 19 (08-31-22 @ 05:13) (19 - 20)  SpO2: 98% (08-31-22 @ 05:13) (95% - 100%)  Wt(kg): --  I&O's Summary    30 Aug 2022 07:01  -  31 Aug 2022 07:00  --------------------------------------------------------  IN: 0 mL / OUT: 1 mL / NET: -1 mL      Weight (kg): 72.6 (08-27 @ 19:25)    PHYSICAL EXAM:  Appearance: Comfortable. No acute distress  HEENT:  Atraumatic. Normocephalic.  Normal oral mucosa  Neurologic: A & O x 3, no gross focal deficits.  Cardiovascular: RRR S1 S2, No murmur, no rubs/gallops. No JVD  Respiratory: Lungs clear to auscultation, unlabored   Gastrointestinal:  Soft, Non-tender, + BS  Lower Extremities: 2+ Peripheral Pulses, No clubbing, cyanosis, or edema  Psychiatry: Patient is calm. No agitation.   Skin: warm and dry.    CURRENT CARDIAC MEDICATIONS:  nitroglycerin     SubLingual 0.4 milliGRAM(s) SubLingual every 5 minutes PRN      CURRENT OTHER MEDICATIONS:  trimethoprim  160 mG/sulfamethoxazole 800 mG 1 Tablet(s) Oral two times a day  atorvastatin 10 milliGRAM(s) Oral at bedtime  aspirin  chewable 81 milliGRAM(s) Oral daily  heparin   Injectable 5000 Unit(s) SubCutaneous every 8 hours  sodium chloride 0.9%. 1000 milliLiter(s) (25 mL/Hr) IV Continuous <Continuous>, Stop order after: 3 Hours      LABS:	 	  ( 29 Aug 2022 10:30 )  Troponin T  <0.01,  CPK  57   , CKMB  X    , BNP X        , ( 27 Aug 2022 22:50 )  Troponin T  <0.01,  CPK  X    , CKMB  X    , BNP X        , ( 27 Aug 2022 19:40 )  Troponin T  <0.01,  CPK  X    , CKMB  X    , BNP X                                  12.5   7.31  )-----------( 319      ( 31 Aug 2022 02:50 )             37.7     08-31    139  |  104  |  8.1  ----------------------------<  104<H>  4.0   |  22.0  |  0.78    Ca    9.1      31 Aug 2022 02:50  Phos  1.7     08-29  Mg     2.0     08-29    TPro  6.8  /  Alb  4.1  /  TBili  0.2<L>  /  DBili  x   /  AST  18  /  ALT  13  /  AlkPhos  77  08-30    PT/INR/PTT ( 29 Aug 2022 11:00 )                       :                       :      12.0         :       27.8                  .        .                   .              .           .       1.03        .                                       Lipid Profile: Date: 08-29 @ 10:30  Total cholesterol 192; Direct LDL: --; HDL: 46; Triglycerides:292    HgA1c:   TSH: Thyroid Stimulating Hormone, Serum: 2.39 uIU/mL  Thyroid Stimulating Hormone, Serum: 3.71 uIU/mL      TELEMETRY: SR and ST up to 150  ECG:  < from: 12 Lead ECG (08.30.22 @ 11:09) >  Diagnosis Line Sinus tachycardia    < end of copied text >      DIAGNOSTIC TESTING:  [ X] Echocardiogram:   < from: TTE Echo Complete w/ Contrast w/ Doppler (08.29.22 @ 18:25) >  Summary:   1. Left ventricular ejection fraction, by visual estimation, is 60 to   65%.   2. Normal global left ventricular systolic function.   3. The mitral in-flow pattern reveals no discernable A-wave, therefore   no comment on diastolic function can be made.   4. Normal left atrial size.   5. Normal right atrial size.   6. Mild mitral annular calcification.   7. Trace mitral valve regurgitation.   8. Endocardial visualization was enhanced with intravenous echo contrast.    < end of copied text >

## 2022-08-31 NOTE — EEG REPORT - NS EEG TEXT BOX
[facility]psy Center  Epilepsy Monitoring Unit Report    [facility]: [address], [place] [address], Phone [phone]  [place] Office: [address], [address], [place] [address] Phone [phone]    [facility]: [address] [place] [address], Phone [phone]  [place] Office: [address] [place] [address], Phone [phone]    Patient Name: Georgina Baeza    Age: 31 year, : 1990  Patient ID: -, MRN #: -, Comer: -    Physician Ordering Inpatient EEG: Morro Vivas  Referral Source to EMU: non-elective admission – ER    EMU Study Started: 14:02 on 22    EMU Study Ended: pending discharge    Study Information:    EEG Recording Technique:  The patient underwent continuous Video-EEG monitoring, using Telemetry System hardware on the XLTek Digital System. EEG and video data were stored on a computer hard drive with important events saved in digital archive files. The material was reviewed by a physician (electroencephalographer / epileptologist) on a daily basis. Peterson and seizure detection algorithms were utilized and reviewed. An EEG Technician attended to the patient, and was available throughout daytime work hours.  The epilepsy center neurologist was available in person or on call 24-hours per day.    EEG Placement and Labeling of Electrodes:  The EEG was performed utilizing 20 channel referential EEG connections (coronal over temporal over parasagittal montage) using all standard 10-20 electrode placements with EKG, with additional electrodes placed in the inferior temporal region using the modified 10-10 montage electrode placements for elective admissions, or if deemed necessary. Recording was at a sampling rate of 256 samples per second per channel. Time synchronized digital video recording was done simultaneously with EEG recording. A low light infrared camera was used for low light recording.               History:  This is a 32yo RH female with a history of headaches who presented with seizure-like activity. Code Stroke called morning of  within inconsistent exam and neg CTH/CTA/CTP.     Brought to ED by family for seizure-like activity consisted of two episodes of eyes rolled back and full body shaking. Unresponsive to family, but did react to EMS' sternal rub. Patient's mother states that these episodes started in [facility], and they tend to occur when patient is angry. When EMS arrived, noted presumed SVT, so gave adenosine, but reviewed by ER attending as sinus tachycardia. Upon arrival to ER, staring and minimally responsive to questions; nodded yes when asked if she had chest pain. Received levetiracetam 1000mg in ER. UA consistent with UTI. This morning, Code Stroke called for left visual field cut and right facial droop. Also c/o CP. CTH/CTA/CTP unremarkable. Inconsistent exam per Neurovascular, Dr. Barlow.    Home Antiseizure Medication and Device  none    Interpretation:    Start Date: 2022 – Day 1                                Start Time – 14:02       Duration – 15h 36m    Daily EEG Visual Analysis  Findings: The background was continuous and reactive. During wakefulness, the posterior dominant rhythm consisted of symmetric, well-modulated 11 Hz activity, with amplitude to 50 uV, that attenuated to eye opening.     Background Slowing:  No generalized background slowing was present.    Focal Slowing:   None were present.    Sleep Background:  Drowsiness was characterized by fragmentation, attenuation, and slowing of the background activity.    Sleep was characterized by the presence of vertex waves, symmetric sleep spindles and K-complexes.    Other Non-Epileptiform Findings:  None were present.    Interictal Epileptiform Activity:   None were present.    Events:  No events or seizures recorded.    Artifacts:  Intermittent myogenic and movement artifacts were noted.    ECG:  The heart rate on single channel ECG was predominantly between 80-90 BPM.    ASM:  None    Start Date: 2022 – Day 2                                       Start Time – 08:00      Duration – 23h 45m    Daily EEG Visual Analysis  FINDINGS:  The background, artifacts and HR on single channel ECG were similar as previous day.    Interictal Epileptiform Activity:   None were present.    Events:  No events or seizures recorded.    ASM:  None     EEG Summary:  Normal EEG in the awake, drowsy and asleep states.    Impression/Clinical Correlate:   – : no event or seizure   – : no event or seizure    Normal video EEG in awake, drowsy and asleep states.    ________________________________________    Gabriel Clarke MD  Director, Epilepsy/EMU - [facility]    [facility] Epilepsy Center  Epilepsy Monitoring Unit Report    [facility]: [address], [place] [address], Phone [phone]  [place] Office: [address], [address], [place] [address] Phone [phone]    [facility]: [address] [place] [address], Phone [phone]  [place] Office: [address] [place] [address], Phone [phone]    Patient Name: Georgina Baeza    Age: 31 year, : 1990  Patient ID: -, MRN #: -, Comer: -    Physician Ordering Inpatient EEG: Morro Vivas  Referral Source to EMU: non-elective admission – ER    EMU Study Started: 14:02 on 22    EMU Study Ended: 10:44 on 22    Study Information:    EEG Recording Technique:  The patient underwent continuous Video-EEG monitoring, using Telemetry System hardware on the XLTek Digital System. EEG and video data were stored on a computer hard drive with important events saved in digital archive files. The material was reviewed by a physician (electroencephalographer / epileptologist) on a daily basis. Peterson and seizure detection algorithms were utilized and reviewed. An EEG Technician attended to the patient, and was available throughout daytime work hours.  The epilepsy center neurologist was available in person or on call 24-hours per day.    EEG Placement and Labeling of Electrodes:  The EEG was performed utilizing 20 channel referential EEG connections (coronal over temporal over parasagittal montage) using all standard 10-20 electrode placements with EKG, with additional electrodes placed in the inferior temporal region using the modified 10-10 montage electrode placements for elective admissions, or if deemed necessary. Recording was at a sampling rate of 256 samples per second per channel. Time synchronized digital video recording was done simultaneously with EEG recording. A low light infrared camera was used for low light recording.               History:  This is a 30yo RH female with a history of headaches who presented with seizure-like activity. Code Stroke called morning of  within inconsistent exam and neg CTH/CTA/CTP.     Brought to ED by family for seizure-like activity consisted of two episodes of eyes rolled back and full body shaking. Unresponsive to family, but did react to EMS' sternal rub. Patient's mother states that these episodes started in [facility], and they tend to occur when patient is angry. When EMS arrived, noted presumed SVT, so gave adenosine, but reviewed by ER attending as sinus tachycardia. Upon arrival to ER, staring and minimally responsive to questions; nodded yes when asked if she had chest pain. Received levetiracetam 1000mg in ER. UA consistent with UTI. This morning, Code Stroke called for left visual field cut and right facial droop. Also c/o CP. CTH/CTA/CTP unremarkable. Inconsistent exam per Neurovascular, Dr. Barlow.    Home Antiseizure Medication and Device  none    Interpretation:    Start Date: 2022 – Day 1                                Start Time – 14:02       Duration – 15h 36m    Daily EEG Visual Analysis  Findings: The background was continuous and reactive. During wakefulness, the posterior dominant rhythm consisted of symmetric, well-modulated 11 Hz activity, with amplitude to 50 uV, that attenuated to eye opening.     Background Slowing:  No generalized background slowing was present.    Focal Slowing:   None were present.    Sleep Background:  Drowsiness was characterized by fragmentation, attenuation, and slowing of the background activity.    Sleep was characterized by the presence of vertex waves, symmetric sleep spindles and K-complexes.    Other Non-Epileptiform Findings:  None were present.    Interictal Epileptiform Activity:   None were present.    Events:  No events or seizures recorded.    Artifacts:  Intermittent myogenic and movement artifacts were noted.    ECG:  The heart rate on single channel ECG was predominantly between 80-90 BPM.    ASM:  None    Start Date: 2022 – Day 2                                       Start Time – 08:00      Duration – 23h 45m    Daily EEG Visual Analysis  FINDINGS:  The background, artifacts and HR on single channel ECG were similar as previous day.    Interictal Epileptiform Activity:   None were present.    Events:  No events or seizures recorded.    ASM:  None     Start Date: 2022 – Day 3                                       Start Time – 08:00      Duration – 2h 42m    Daily EEG Visual Analysis  FINDINGS:  The background, artifacts and HR on single channel ECG were similar as previous day.    Interictal Epileptiform Activity:   None were present.    Events:  No events or seizures recorded.    ASM:  None     EEG Summary:  Normal EEG in the awake, drowsy and asleep states.    Impression/Clinical Correlate:   – : no event or seizure   – : no event or seizure    No events or seizures were recorded. Normal video EEG in awake, drowsy and asleep states.    ________________________________________    Gabriel Clarke MD  Director, Epilepsy/EM - [facility]    [facility] Epilepsy Center  Epilepsy Monitoring Unit Report    [facility]: [address], [place] [address], Phone [phone]  [place] Office: [address], [address], [place] [address] Phone [phone]    [facility]: [address] [place] [address], Phone [phone]  [place] Office: [address] [place] [address], Phone [phone]    Patient Name: Georgina Baeza    Age: 31 year, : 1990  Patient ID: -, MRN #: -, Comer: -    Physician Ordering Inpatient EEG: Morro Vivas  Referral Source to EMU: non-elective admission – ER    EMU Study Started: 14:02 on 22    EMU Study Ended: 10:44 on 22    Study Information:    EEG Recording Technique:  The patient underwent continuous Video-EEG monitoring, using Telemetry System hardware on the XLTek Digital System. EEG and video data were stored on a computer hard drive with important events saved in digital archive files. The material was reviewed by a physician (electroencephalographer / epileptologist) on a daily basis. Peterson and seizure detection algorithms were utilized and reviewed. An EEG Technician attended to the patient, and was available throughout daytime work hours.  The epilepsy center neurologist was available in person or on call 24-hours per day.    EEG Placement and Labeling of Electrodes:  The EEG was performed utilizing 20 channel referential EEG connections (coronal over temporal over parasagittal montage) using all standard 10-20 electrode placements with EKG, with additional electrodes placed in the inferior temporal region using the modified 10-10 montage electrode placements for elective admissions, or if deemed necessary. Recording was at a sampling rate of 256 samples per second per channel. Time synchronized digital video recording was done simultaneously with EEG recording. A low light infrared camera was used for low light recording.               History:  This is a 32yo RH female with a history of headaches who presented with seizure-like activity. Code Stroke called morning of  within inconsistent exam and neg CTH/CTA/CTP.     Brought to ED by family for seizure-like activity consisted of two episodes of eyes rolled back and full body shaking. Unresponsive to family, but did react to EMS' sternal rub. Patient's mother states that these episodes started in [facility], and they tend to occur when patient is angry. When EMS arrived, noted presumed SVT, so gave adenosine, but reviewed by ER attending as sinus tachycardia. Upon arrival to ER, staring and minimally responsive to questions; nodded yes when asked if she had chest pain. Received levetiracetam 1000mg in ER. UA consistent with UTI. This morning, Code Stroke called for left visual field cut and right facial droop. Also c/o CP. CTH/CTA/CTP unremarkable. Inconsistent exam per Neurovascular, Dr. Barlow.    Home Antiseizure Medication and Device  none    Interpretation:    Start Date: 2022 – Day 1                                Start Time – 14:02       Duration – 15h 36m    Daily EEG Visual Analysis  Findings: The background was continuous and reactive. During wakefulness, the posterior dominant rhythm consisted of symmetric, well-modulated 11 Hz activity, with amplitude to 50 uV, that attenuated to eye opening.     Background Slowing:  No generalized background slowing was present.    Focal Slowing:   None were present.    Sleep Background:  Drowsiness was characterized by fragmentation, attenuation, and slowing of the background activity.    Sleep was characterized by the presence of vertex waves, symmetric sleep spindles and K-complexes.    Other Non-Epileptiform Findings:  None were present.    Interictal Epileptiform Activity:   None were present.    Events:  No events or seizures recorded.    Artifacts:  Intermittent myogenic and movement artifacts were noted.    ECG:  The heart rate on single channel ECG was predominantly between 80-90 BPM.    ASM:  None    Start Date: 2022 – Day 2                                       Start Time – 08:00      Duration – 23h 45m    Daily EEG Visual Analysis  FINDINGS:  The background, artifacts and HR on single channel ECG were similar as previous day.    Interictal Epileptiform Activity:   None were present.    Events:  No events or seizures recorded.    ASM:  None     Start Date: 2022 – Day 3                                       Start Time – 08:00      Duration – 2h 42m    Daily EEG Visual Analysis  FINDINGS:  The background, artifacts and HR on single channel ECG were similar as previous day.    Interictal Epileptiform Activity:   None were present.    Events:  No events or seizures recorded.    ASM:  None     EEG Summary:  Normal EEG in the awake, drowsy and asleep states.    Impression/Clinical Correlate:   – : no event or seizure   – : no event or seizure    No events or seizures were recorded. Normal video EEG in awake, drowsy and asleep states.    ________________________________________    Gabriel Clarke MD  Director, Epilepsy/EM - [facility]

## 2022-08-31 NOTE — PROGRESS NOTE ADULT - SUBJECTIVE AND OBJECTIVE BOX
IMANI HERNANDEZ    [id number]    31y      Female      pt seen and examined at bedside.  Panamanian speaking,  services used.     denies any headache, feels okay, no other complaints  vision is back completely  intermittent chest pain, reproducible.         INTERVAL HPI/OVERNIGHT EVENTS: no acute events overnight      REVIEW OF SYSTEMS:    CONSTITUTIONAL: No fever  RESPIRATORY: No cough, no shortness of breath  CARDIOVASCULAR: +chest pain  GASTROINTESTINAL: No abdominal or epigastric pain. No nausea, vomiting  NEUROLOGICAL: +headaches,         Vital Signs Last 24 Hrs  T(C): 37 (31 Aug 2022 11:30), Max: 37 (31 Aug 2022 11:30)  T(F): 98.6 (31 Aug 2022 11:30), Max: 98.6 (31 Aug 2022 11:30)  HR: 131 (31 Aug 2022 11:30) (75 - 142)  BP: 117/83 (31 Aug 2022 11:30) (117/83 - 126/68)  BP(mean): --  RR: 18 (31 Aug 2022 11:30) (18 - 20)  SpO2: 100% (31 Aug 2022 11:30) (95% - 100%)    Parameters below as of 31 Aug 2022 11:30  Patient On (Oxygen Delivery Method): room air            PHYSICAL EXAM:    GENERAL: NAD, well-groomed  HEENT: PERRL, +EOMI  NECK: soft, Supple  CHEST/LUNG: Clear to percussion bilaterally; No wheezing  HEART: S1S2+, Regular rate and rhythm; No murmurs  ABDOMEN: Soft, Nontender, Nondistended; Bowel sounds present  EXTREMITIES:   No clubbing, cyanosis, or edema  SKIN: No rashes or lesions  NEURO: AAOX3          LABS:                                              12.5   7.31  )-----------( 319      ( 31 Aug 2022 02:50 )             37.7   08-31    139  |  104  |  8.1  ----------------------------<  104<H>  4.0   |  22.0  |  0.78    Ca    9.1      31 Aug 2022 02:50    TPro  6.8  /  Alb  4.1  /  TBili  0.2<L>  /  DBili  x   /  AST  18  /  ALT  13  /  AlkPhos  77  08-30      MEDICATIONS  (STANDING):  aspirin  chewable 81 milliGRAM(s) Oral daily  atorvastatin 10 milliGRAM(s) Oral at bedtime  heparin   Injectable 5000 Unit(s) SubCutaneous every 8 hours  metoprolol succinate ER 25 milliGRAM(s) Oral daily  sodium chloride 0.9%. 1000 milliLiter(s) (25 mL/Hr) IV Continuous <Continuous>  sodium chloride 0.9%. 1000 milliLiter(s) (50 mL/Hr) IV Continuous <Continuous>  trimethoprim  160 mG/sulfamethoxazole 800 mG 1 Tablet(s) Oral two times a day    MEDICATIONS  (PRN):  nitroglycerin     SubLingual 0.4 milliGRAM(s) SubLingual every 5 minutes PRN Chest Pain            RADIOLOGY & ADDITIONAL TESTS:      < from: PACS Image (CT Angio Neck w/ IV Cont) (08.29.22 @ 10:58) >  Image(s) Available    < end of copied text >    < from: CT Brain Stroke Protocol (08.29.22 @ 10:48) >    ACC: [id number] EXAM:  CT ANGIO BRAIN (W)AW IC                        ACC: [id number] EXAM:  CT PERFUSION W MAPS IC                        ACC: [id number] EXAM:  CT BRAIN STROKE PROTOCOL                        ACC: [id number] EXAM:  CT ANGIO NECK (W)AW IC                        PROCEDURE DATE:  08/29/2022          INTERPRETATION:  CT HEAD STROKE PROTOCOL, CT ANGIO NECK WITHOUT AND OR   WITH IV CONTRAST, CT PERFUSION WITH MAPS WITH IV CONTRAST, CT ANGIO BRAIN   WITHOUT AND OR WITH IV CONTRAST    CLINICAL HISTORY: Code Stroke. Right facial weakness    CT HEAD TECHNIQUE:  Noncontrast CT.  Axial Acqisition.  Sagittal and coronal reformations.    COMPARISON:  Head CT is compared to prior study of 8/27/2022    FINDINGS:  *  HEMORRHAGE:  No evidence of intracranial hemorrhage.  *  BRAIN PARENCHYMA:  No abnormal regions of parenchymal attenuation. No   mass or mass effect.  *  VENTRICLES / SHIFT:  No hydrocephalus. No midline shift.  *  EXTRA-AXIAL / BASAL CISTERNS:  No extra-axial mass. Basal cisterns   preserved.  *  CALVARIUM AND EXTRACRANIAL SOFT TISSUES:  No depressed calvarial   fracture.  *  SINUSES, ORBITS, MASTOIDS:  The visualized paranasal sinuses and   mastoid air cells are well aerated.  ----------------------------------------    CTA TECHNIQUE:  CT angiography of the neck and brain was performed during the dynamic   administration of intravenous contrast.  MIP reconstructions were   performed and reviewed. Multiplanar reformations were obtained.  Contrast administered 70 cc Omnipaque 350, contrast discarded 0 cc    CTA FINDINGS:  NECK  RIGHT CAROTID CIRCULATION:  *  Evaluation of the right carotid circulation demonstrates no   hemodynamic significant narrowing utilizing a distal reference.  LEFT CAROTID CIRCULATION:  *  Evaluation of the left carotid circulation demonstrates no hemodynamic   significant narrowing utilizing a distal reference.  VERTEBRAL ARTERIES:  *  Evaluation of the vertebral arteries reveals no evidence of a   vertebral artery occlusion or dissection.    BRAIN  ANTERIOR CIRCULATION:  *  Distal internal carotid arteries are patent.  *  Anterior cerebral arteries are patent.  *  Middle cerebral arteries are patent.  POSTERIOR CIRCULATION:  *  Distal vertebral arteries are patent. Bilateral posteriorinferior   cerebellar arteries are seen  *  Basilar artery is patent. Proximal superior cerebellar arteries are   patent  *  Posterior cerebral arteries are patent.  --------------------------------------------------    CT PERFUSION TECHNIQUE:  CT perfusion was performed during the administration of intravenous   contrast.  There was a total of 8 cm brain coverage.  The images were processed utilizing RAPID software.  Contrast administered 50 cc Omnipaque 350, contrast discarded 0 cc    Ischemic tissue is defined as TMAX > 6 seconds.  Core infarct is defined as CBF < 30%.    CT PERFUSION FINDINGS:  *  There are no regions of hypoperfusion identified.  *  The volume of ischemic tissue (core infarct and penumbra) measures 0   mL.  *  The core infarct measures 0 mL.  *  The mismatch volume (penumbra) measure 0 mL.  *  The mismatch ratio (ischemic tissue / core) is: None  *  The hypoperfusion index (Tmax>10s/Tmax>6s) is: Not applicable      IMPRESSION:  *  NORMAL NONCONTRAST CT OF THE BRAIN  *  NO HEMODYNAMIC SIGNIFICANT NARROWING WITHIN THE NECK.  *  NO PROXIMAL LARGE VESSEL OCCLUSION INTRACRANIALLY.  *  NO AREAS OF ISCHEMIA IDENTIFIED ON PERFUSION IMAGING.    Findings discussed with Dr. Barlow at 11:21 AM on 8/29/2022    --- End of Report ---            DONA BLACKWELL MD; Attending Radiologist  This document has been electronically signed. Aug 29 2022 11:23AM    < end of copied text >      EKG - reviewed during RApid - sinus tachycardia

## 2022-08-31 NOTE — PROGRESS NOTE ADULT - PROBLEM SELECTOR PLAN 2
Trops neg x3  -No acute coronary CP, episode during RRT doesn't fit ACS picture. Only occurs with ?syncope / headache spells. No prior cardiac history  -C/w ASA 81 daily  -TTE EF 60-65%,  No PFO visualized with the aide of bubbles.  -F/u ESR and CRP

## 2022-08-31 NOTE — PROGRESS NOTE ADULT - SUBJECTIVE AND OBJECTIVE BOX
[facility]                                                                     Gabriel Clarke MD                                              Epilepsy Consult #: [phone] ([phone])                                        Office: [address] [place] [address], 2nd floor, [place], [place], [address]                                                 Phone: [phone]; Fax: [phone]                            ==============================================    EPILEPSY FOLLOW-UP NOTE      INTERVAL HISTORY:  Continuous video EEG remains normal. Pending MRI brain.    MEDICATIONS  (STANDING):  aspirin  chewable 81 milliGRAM(s) Oral daily  atorvastatin 10 milliGRAM(s) Oral at bedtime  heparin   Injectable 5000 Unit(s) SubCutaneous every 8 hours  sodium chloride 0.9%. 1000 milliLiter(s) (25 mL/Hr) IV Continuous <Continuous>  trimethoprim  160 mG/sulfamethoxazole 800 mG 1 Tablet(s) Oral two times a day    Vital Signs Last 24 Hrs  T(C): 36.6 (31 Aug 2022 08:13), Max: 36.8 (30 Aug 2022 15:25)  T(F): 97.9 (31 Aug 2022 08:13), Max: 98.2 (30 Aug 2022 15:25)  HR: 99 (31 Aug 2022 08:13) (75 - 104)  BP: 125/88 (31 Aug 2022 08:13) (117/84 - 126/68)  BP(mean): --  RR: 18 (31 Aug 2022 08:13) (18 - 20)  SpO2: 99% (31 Aug 2022 08:13) (95% - 100%)    Parameters below as of 31 Aug 2022 08:13  Patient On (Oxygen Delivery Method): room air    GENERAL PHYSICAL EXAM:  GEN: no distress   HEENT: NCAT, OP clear  EYES: sclera white, conjunctiva clear, no nystagmus  NECK: supple  CV: RRR, no murmur     		  PULM: CTAB, no wheezing  ABD: soft, +BS, NT  EXT: peripheral pulse intact, no cyanosis  MSK: muscle tone and strength normal  SKIN: warm, dry    NEUROLOGICAL EXAM:  Mental Status  Orientation: awake and alert  Language: clear     Cranial Nerves  II: full visual fields intact   III, IV, VI: PERRL, EOMI  V, VII: facial sensation and movement intact and symmetric   VIII: hearing intact   IX, X: uvula midline, soft palate elevates normally   XI: BL shoulder shrug intact   XII: tongue midline    Motor  5/5 BUE and BLE                 Tone and bulk are normal in upper and lower limbs  No pronator drift    Sensation  Intact to light touch and pinprick in all 4 EXTs    Reflex  2+ in BL biceps and patella                                    Plantar responses downward bilaterally    Coordination  Normal FTN bilaterally    Gait  Deferred       LABS:                          12.5   7.31  )-----------( 319      ( 31 Aug 2022 02:50 )             37.7     08-31    139  |  104  |  8.1  ----------------------------<  104<H>  4.0   |  22.0  |  0.78    Ca    9.1      31 Aug 2022 02:50  Phos  1.7     08-29  Mg     2.0     08-29    TPro  6.8  /  Alb  4.1  /  TBili  0.2<L>  /  DBili  x   /  AST  18  /  ALT  13  /  AlkPhos  77  08-30    CAPILLARY BLOOD GLUCOSE        LIVER FUNCTIONS - ( 30 Aug 2022 03:00 )  Alb: 4.1 g/dL / Pro: 6.8 g/dL / ALK PHOS: 77 U/L / ALT: 13 U/L / AST: 18 U/L / GGT: x           PT/INR - ( 29 Aug 2022 11:00 )   PT: 12.0 sec;   INR: 1.03 ratio         PTT - ( 29 Aug 2022 11:00 )  PTT:27.8 sec      08-30-22 @ 07:01  -  08-31-22 @ 07:00  --------------------------------------------------------  IN: 0 mL / OUT: 1 mL / NET: -1 mL          OTHER IMAGING AND STUDIES:    non-elective EMU 8/29 -8/31/22:  EEG Summary:  Normal EEG in the awake, drowsy and asleep states.    Impression/Clinical Correlate:  8/29 – 8/30: no event or seizure  8/30 – 8/31: no event or seizure    Normal video EEG in awake, drowsy and asleep states.      CTH/CTA/CTP (08.29.22 @ 10:58) >  IMPRESSION:  *  NORMAL NONCONTRAST CT OF THE BRAIN  *  NO HEMODYNAMIC SIGNIFICANT NARROWING WITHIN THE NECK.  *  NO PROXIMAL LARGE VESSEL OCCLUSION INTRACRANIALLY.  *  NO AREAS OF ISCHEMIA IDENTIFIED ON PERFUSION IMAGING.                                                                        [facility]                                                                     Gabriel Clarke MD                                              Epilepsy Consult #: [phone] ([phone])                                        Office: [address] [place] [address], 2nd floor, [place], [place], [address]                                                 Phone: [phone]; Fax: [phone]                            ==============================================    EPILEPSY FOLLOW-UP NOTE      INTERVAL HISTORY:  Continuous video EEG remains normal. MRI brain unremarkable.     MEDICATIONS  (STANDING):  aspirin  chewable 81 milliGRAM(s) Oral daily  atorvastatin 10 milliGRAM(s) Oral at bedtime  heparin   Injectable 5000 Unit(s) SubCutaneous every 8 hours  sodium chloride 0.9%. 1000 milliLiter(s) (25 mL/Hr) IV Continuous <Continuous>  trimethoprim  160 mG/sulfamethoxazole 800 mG 1 Tablet(s) Oral two times a day    Vital Signs Last 24 Hrs  T(C): 36.6 (31 Aug 2022 08:13), Max: 36.8 (30 Aug 2022 15:25)  T(F): 97.9 (31 Aug 2022 08:13), Max: 98.2 (30 Aug 2022 15:25)  HR: 99 (31 Aug 2022 08:13) (75 - 104)  BP: 125/88 (31 Aug 2022 08:13) (117/84 - 126/68)  BP(mean): --  RR: 18 (31 Aug 2022 08:13) (18 - 20)  SpO2: 99% (31 Aug 2022 08:13) (95% - 100%)    Parameters below as of 31 Aug 2022 08:13  Patient On (Oxygen Delivery Method): room air    GENERAL PHYSICAL EXAM:  GEN: no distress   HEENT: NCAT, OP clear  EYES: sclera white, conjunctiva clear, no nystagmus  NECK: supple  CV: RRR, no murmur     		  PULM: CTAB, no wheezing  ABD: soft, +BS, NT  EXT: peripheral pulse intact, no cyanosis  MSK: muscle tone and strength normal  SKIN: warm, dry    NEUROLOGICAL EXAM:  Mental Status  Orientation: awake and alert  Language: clear     Cranial Nerves  II: full visual fields intact   III, IV, VI: PERRL, EOMI  V, VII: facial sensation and movement intact and symmetric   VIII: hearing intact   IX, X: uvula midline, soft palate elevates normally   XI: BL shoulder shrug intact   XII: tongue midline    Motor  5/5 BUE and BLE                 Tone and bulk are normal in upper and lower limbs  No pronator drift    Sensation  Intact to light touch and pinprick in all 4 EXTs    Reflex  2+ in BL biceps and patella                                    Plantar responses downward bilaterally    Coordination  Normal FTN bilaterally    Gait  Deferred       LABS:                          12.5   7.31  )-----------( 319      ( 31 Aug 2022 02:50 )             37.7     08-31    139  |  104  |  8.1  ----------------------------<  104<H>  4.0   |  22.0  |  0.78    Ca    9.1      31 Aug 2022 02:50  Phos  1.7     08-29  Mg     2.0     08-29    TPro  6.8  /  Alb  4.1  /  TBili  0.2<L>  /  DBili  x   /  AST  18  /  ALT  13  /  AlkPhos  77  08-30    CAPILLARY BLOOD GLUCOSE        LIVER FUNCTIONS - ( 30 Aug 2022 03:00 )  Alb: 4.1 g/dL / Pro: 6.8 g/dL / ALK PHOS: 77 U/L / ALT: 13 U/L / AST: 18 U/L / GGT: x           PT/INR - ( 29 Aug 2022 11:00 )   PT: 12.0 sec;   INR: 1.03 ratio         PTT - ( 29 Aug 2022 11:00 )  PTT:27.8 sec      08-30-22 @ 07:01  -  08-31-22 @ 07:00  --------------------------------------------------------  IN: 0 mL / OUT: 1 mL / NET: -1 mL          OTHER IMAGING AND STUDIES:    non-elective EMU 8/29 -8/31/22:  EEG Summary:  Normal EEG in the awake, drowsy and asleep states.    Impression/Clinical Correlate:  8/29 – 8/30: no event or seizure  8/30 – 8/31: no event or seizure    Normal video EEG in awake, drowsy and asleep states.        < from: MR Head No Cont (08.31.22 @ 13:11) >  IMPRESSION: Unremarkable MRI of the brain.      CTH/CTA/CTP (08.29.22 @ 10:58) >  IMPRESSION:  *  NORMAL NONCONTRAST CT OF THE BRAIN  *  NO HEMODYNAMIC SIGNIFICANT NARROWING WITHIN THE NECK.  *  NO PROXIMAL LARGE VESSEL OCCLUSION INTRACRANIALLY.  *  NO AREAS OF ISCHEMIA IDENTIFIED ON PERFUSION IMAGING.

## 2022-08-31 NOTE — DISCHARGE NOTE PROVIDER - HOSPITAL COURSE
29 y/o f with pmh of headaches presents to [facility] ED after witnessed episode of seizure like activity presented to [facility] ED for witness seizure like activity. Rapid/Code stroke was called on 8/29 for sudden loss of left eye vision, chest pain, CT head and angio - neg. low suspicion for Acute CVA. possible complex migraines - evaluated by neurologist - not a candidate for tPA 2/2 fluctuating and inconsistent neuro exam, Continuous video EEG  normal. MRI brain unremarkable. Cardiology was consultecx for intermittent chest pian episodes and sinus tachycardia. She was started on Toprol XL per cardio recs.. she was also treated for klebsiella UTI while in house. SHe was cleared for discharge by cardio and neuro. She will need close f/u in 1week with cardio.

## 2022-08-31 NOTE — DISCHARGE NOTE PROVIDER - CARE PROVIDERS DIRECT ADDRESSES
[email or web address]

## 2022-08-31 NOTE — DISCHARGE NOTE PROVIDER - NSDCCPCAREPLAN_GEN_ALL_CORE_FT
PRINCIPAL DISCHARGE DIAGNOSIS  Diagnosis: Psychogenic nonepileptic seizure  Assessment and Plan of Treatment:       SECONDARY DISCHARGE DIAGNOSES  Diagnosis: Chest pain  Assessment and Plan of Treatment: f/u with cardiology in 1week    Diagnosis: Tachycardia  Assessment and Plan of Treatment:     Diagnosis: UTI due to Klebsiella species  Assessment and Plan of Treatment: complete antibiotic course

## 2022-08-31 NOTE — PROGRESS NOTE ADULT - TIME BILLING
Assessing presenting problems of acute illness, as well as medical decision making including initiating plan of care, obtaining history, examining the patient, reviewing data, reviewing radiologic exams, coordinating care with multidisciplinary team and writing this note.
Assessing presenting problems of acute illness, as well as medical decision making including initiating plan of care, obtaining history, examining the patient, reviewing data, reviewing radiologic exams, coordinating care with multidisciplinary team and writing this note.
as above.
as above.

## 2022-08-31 NOTE — DISCHARGE NOTE NURSING/CASE MANAGEMENT/SOCIAL WORK - PATIENT PORTAL LINK FT
You can access the FollowMyHealth Patient Portal offered by Health system by registering at the following website: [email or web address]. By joining [employer]’s FollowMyHealth portal, you will also be able to view your health information using other applications (apps) compatible with our system.

## 2022-08-31 NOTE — PROGRESS NOTE ADULT - ASSESSMENT
31 y/o f with pmh of headaches presents to [facility] ED after witnessed episode of seizure like activity presented to [facility] ED for witness seizure like activity. Rapid/Code stroke was called on 8/29 for sudden loss of left eye vision, chest pain, CT head and angio - neg. low suspicion for Acute CVA. possible complex migraines - evaluated by neurologist - not a candidate for tPA 2/2 fluctuating and inconsistent neuro exam, MRI and EEG final recs pending.       possible  seizure like activity .   - Family member witnessed pt with shaking, eyes rolling  - Pt does not recall this event. S/P keppra in ED   - CT imaging negative for acute infarcts  MR Head- pending   ECHO  - Bubble study pending   - Geovany consulted - EEG  - may be PNES - awaiting final recs.    sudden b/l eye vision loss - Rule out CVA/complex migraine vs other  - CT head and CT angio - neg - f/u MR head, ECHO with bubble study   ct aspirin and statin for now - DC if mRI negative - as discussed with NEuro     Dyslipidemia - will benefit from LSM    chest pain -atypical - reproducible, trop - neg   cardio following - ECHO bubble pending       Klebsiella TI  - Bactrim DS - f/u Sensitivities         Hx of headaches  -- tried meds in past       Supportive  - Heparin subq   - Diet    Dispo: Home pending Final EEG, echo and MR head

## 2022-08-31 NOTE — DISCHARGE NOTE PROVIDER - NSDCMRMEDTOKEN_GEN_ALL_CORE_FT
metoprolol succinate 25 mg oral tablet, extended release: 1 tab(s) orally once a day  sulfamethoxazole-trimethoprim 800 mg-160 mg oral tablet: 1 tab(s) orally 2 times a day

## 2022-08-31 NOTE — DISCHARGE NOTE PROVIDER - CARE PROVIDER_API CALL
Misha Mann)  Cardiology; Internal Medicine  [address], [address]  [place], [place] [id number]  Phone: [phone]  Fax: [phone]  Follow Up Time:     Gabriel Clarke)  EEGEpilepsy; Neurology  [address], [address]nd [place] [address]  Phone: [phone]  Fax: [phone]  Follow Up Time:

## 2022-12-15 ENCOUNTER — OFFICE (OUTPATIENT)
Dept: URBAN - METROPOLITAN AREA CLINIC 94 | Facility: CLINIC | Age: 32
Setting detail: OPHTHALMOLOGY
End: 2022-12-15
Payer: COMMERCIAL

## 2022-12-15 DIAGNOSIS — H40.013: ICD-10-CM

## 2022-12-15 PROBLEM — H52.13 MYOPIA; BOTH EYES: Status: ACTIVE | Noted: 2022-12-15

## 2022-12-15 PROCEDURE — 92250 FUNDUS PHOTOGRAPHY W/I&R: CPT | Performed by: OPTOMETRIST

## 2022-12-15 PROCEDURE — 92004 COMPRE OPH EXAM NEW PT 1/>: CPT | Performed by: OPTOMETRIST

## 2022-12-15 ASSESSMENT — REFRACTION_AUTOREFRACTION
OD_AXIS: 175
OS_CYLINDER: -0.50
OS_AXIS: 015
OD_SPHERE: -0.50
OD_CYLINDER: -0.75
OS_SPHERE: -0.75

## 2022-12-15 ASSESSMENT — KERATOMETRY
OS_AXISANGLE_DEGREES: 105
OS_K2POWER_DIOPTERS: 42.75
OD_AXISANGLE_DEGREES: 080
OD_K2POWER_DIOPTERS: 42.25
OD_K1POWER_DIOPTERS: 41.00
OS_K1POWER_DIOPTERS: 41.50

## 2022-12-15 ASSESSMENT — REFRACTION_MANIFEST
OS_SPHERE: -0.75
OD_VA1: 20/25
OD_SPHERE: -0.50
OS_AXIS: 010
OD_AXIS: 170
OD_CYLINDER: -0.50
OS_CYLINDER: -0.50
OS_VA1: 20/25

## 2022-12-15 ASSESSMENT — AXIALLENGTH_DERIVED
OS_AL: 24.5218
OS_AL: 24.5218
OD_AL: 24.6154
OD_AL: 24.6685

## 2022-12-15 ASSESSMENT — TONOMETRY
OD_IOP_MMHG: 13
OS_IOP_MMHG: 15

## 2022-12-15 ASSESSMENT — SPHEQUIV_DERIVED
OD_SPHEQUIV: -0.875
OD_SPHEQUIV: -0.75
OS_SPHEQUIV: -1
OS_SPHEQUIV: -1

## 2022-12-15 ASSESSMENT — CONFRONTATIONAL VISUAL FIELD TEST (CVF)
OS_FINDINGS: FULL
OD_FINDINGS: FULL

## 2022-12-15 ASSESSMENT — VISUAL ACUITY
OD_BCVA: 20/150
OS_BCVA: 20/150

## 2023-01-06 ENCOUNTER — APPOINTMENT (OUTPATIENT)
Dept: NEUROLOGY | Facility: CLINIC | Age: 33
End: 2023-01-06
Payer: MEDICAID

## 2023-01-06 VITALS
SYSTOLIC BLOOD PRESSURE: 110 MMHG | HEIGHT: 65 IN | DIASTOLIC BLOOD PRESSURE: 80 MMHG | WEIGHT: 176 LBS | BODY MASS INDEX: 29.32 KG/M2

## 2023-01-06 DIAGNOSIS — Z83.3 FAMILY HISTORY OF DIABETES MELLITUS: ICD-10-CM

## 2023-01-06 DIAGNOSIS — Z78.9 OTHER SPECIFIED HEALTH STATUS: ICD-10-CM

## 2023-01-06 DIAGNOSIS — R55 SYNCOPE AND COLLAPSE: ICD-10-CM

## 2023-01-06 DIAGNOSIS — G44.89 OTHER HEADACHE SYNDROME: ICD-10-CM

## 2023-01-06 DIAGNOSIS — G56.21 LESION OF ULNAR NERVE, RIGHT UPPER LIMB: ICD-10-CM

## 2023-01-06 PROCEDURE — 99203 OFFICE O/P NEW LOW 30 MIN: CPT

## 2023-01-06 PROCEDURE — 99213 OFFICE O/P EST LOW 20 MIN: CPT

## 2023-01-06 RX ORDER — METOPROLOL SUCCINATE 25 MG/1
25 TABLET, EXTENDED RELEASE ORAL
Refills: 0 | Status: ACTIVE | COMMUNITY

## 2023-01-06 NOTE — HISTORY OF PRESENT ILLNESS
[FreeTextEntry1] : I saw this patient in the office today.\par \par She had initially been seen at [facility] in August 2022.\par She had presented after an episode of generalized shaking and stiffening.\par She had described a similar episode a year before while living in [facility].\par She also describes a long history of chronic headaches for many years.\par \par She was started on metoprolol for abnormal heart rhythm.\par She reports that she has had no significant headaches since starting the medication.\par \par She reports that she has had pains in her right elbow since her hospitalization and was referred back here for further evaluation.

## 2023-01-06 NOTE — ASSESSMENT
[FreeTextEntry1] : This is a 32-year-old woman with a history of chronic headaches that has improved with beta-blocker.\par Brain imaging has been negative.\par Likely this represents migraine that evolved into a chronic daily pattern.\par Beta-blockers are known to have preventive effect for migraine.\par Likely this explains her improvement.\par \par The right elbow pain has been with her since the hospital.\par There is no sensory finding on examination.\par Motor examination is difficult due to the pain.\par Ulnar neuropathy is certainly in the differential diagnosis.\par I will therefore obtain EMG and nerve conduction studies.\par If this is normal, then I would suggest to refer her for orthopedic evaluation.\par \par I will see her back after the testing.

## 2023-01-06 NOTE — PHYSICAL EXAM
[General Appearance - Alert] : alert [General Appearance - In No Acute Distress] : in no acute distress [Oriented To Time, Place, And Person] : oriented to person, place, and time [Affect] : the affect was normal [Memory Recent] : recent memory was not impaired [Memory Remote] : remote memory was not impaired [Cranial Nerves Optic (II)] : visual acuity intact bilaterally,  visual fields full to confrontation, pupils equal round and reactive to light [Cranial Nerves Oculomotor (III)] : extraocular motion intact [Cranial Nerves Trigeminal (V)] : facial sensation intact symmetrically [Cranial Nerves Facial (VII)] : face symmetrical [Cranial Nerves Vestibulocochlear (VIII)] : hearing was intact bilaterally [Cranial Nerves Glossopharyngeal (IX)] : tongue and palate midline [Cranial Nerves Accessory (XI - Cranial And Spinal)] : head turning and shoulder shrug symmetric [Cranial Nerves Hypoglossal (XII)] : there was no tongue deviation with protrusion [Motor Tone] : muscle tone was normal in all four extremities [Sensation Tactile Decrease] : light touch was intact [Sensation Pain / Temperature Decrease] : pain and temperature was intact [Sensation Vibration Decrease] : vibration was intact [Abnormal Walk] : normal gait [2+] : Patella left 2+ [Optic Disc Abnormality] : the optic disc were normal in size and color [Dysarthria] : no dysarthria [Aphasia] : no dysphasia/aphasia [Romberg's Sign] : Romberg's sign was negtive [Coordination - Dysmetria Impaired Finger-to-Nose Bilateral] : not present [Plantar Reflex Right Only] : normal on the right [Plantar Reflex Left Only] : normal on the left

## 2023-01-06 NOTE — CONSULT LETTER
[Dear  ___] : Dear  [unfilled], [Courtesy Letter:] : I had the pleasure of seeing your patient, [unfilled], in my office today. [Please see my note below.] : Please see my note below. [Consult Closing:] : Thank you very much for allowing me to participate in the care of this patient.  If you have any questions, please do not hesitate to contact me. [Sincerely,] : Sincerely, [FreeTextEntry3] : Morro Vivas MD.

## 2023-01-06 NOTE — DATA REVIEWED
[de-identified] : Brain MRI was performed on 8/31/2022.\par The study was unremarkable. [de-identified] : 48 hours of video EEG monitoring at [facility] was normal.

## 2023-03-23 ENCOUNTER — APPOINTMENT (OUTPATIENT)
Dept: NEUROLOGY | Facility: CLINIC | Age: 33
End: 2023-03-23
Payer: MEDICAID

## 2023-03-23 ENCOUNTER — NON-APPOINTMENT (OUTPATIENT)
Age: 33
End: 2023-03-23

## 2023-03-23 PROCEDURE — 95909 NRV CNDJ TST 5-6 STUDIES: CPT

## 2023-03-23 PROCEDURE — 95886 MUSC TEST DONE W/N TEST COMP: CPT

## 2023-04-28 ENCOUNTER — APPOINTMENT (OUTPATIENT)
Dept: NEUROLOGY | Facility: CLINIC | Age: 33
End: 2023-04-28

## 2023-06-15 ENCOUNTER — OFFICE (OUTPATIENT)
Dept: URBAN - METROPOLITAN AREA CLINIC 94 | Facility: CLINIC | Age: 33
Setting detail: OPHTHALMOLOGY
End: 2023-06-15
Payer: COMMERCIAL

## 2023-06-15 DIAGNOSIS — H40.013: ICD-10-CM

## 2023-06-15 PROCEDURE — 92014 COMPRE OPH EXAM EST PT 1/>: CPT | Performed by: OPTOMETRIST

## 2023-06-15 PROCEDURE — 92133 CPTRZD OPH DX IMG PST SGM ON: CPT | Performed by: OPTOMETRIST

## 2023-06-15 PROCEDURE — 92083 EXTENDED VISUAL FIELD XM: CPT | Performed by: OPTOMETRIST

## 2023-06-15 ASSESSMENT — KERATOMETRY
OD_AXISANGLE_DEGREES: 080
OD_K2POWER_DIOPTERS: 42.25
OS_AXISANGLE_DEGREES: 105
OD_K1POWER_DIOPTERS: 41.00
OS_K2POWER_DIOPTERS: 43.00
OS_K1POWER_DIOPTERS: 42.00

## 2023-06-15 ASSESSMENT — SPHEQUIV_DERIVED
OD_SPHEQUIV: -0.75
OS_SPHEQUIV: -1
OS_SPHEQUIV: -1
OS_SPHEQUIV: -1.25
OD_SPHEQUIV: -1
OD_SPHEQUIV: -0.75

## 2023-06-15 ASSESSMENT — REFRACTION_MANIFEST
OD_AXIS: 170
OS_CYLINDER: -0.50
OS_AXIS: 010
OD_CYLINDER: -0.50
OS_SPHERE: -0.75
OD_CYLINDER: -0.50
OS_AXIS: 010
OD_SPHERE: -0.50
OD_VA1: 20/25
OS_SPHERE: -0.75
OD_SPHERE: -0.50
OD_VA1: 20/25
OS_CYLINDER: -0.50
OS_VA1: 20/25
OD_AXIS: 170
OS_VA1: 20/25

## 2023-06-15 ASSESSMENT — VISUAL ACUITY
OS_BCVA: 20/100
OD_BCVA: 20/100-1

## 2023-06-15 ASSESSMENT — CONFRONTATIONAL VISUAL FIELD TEST (CVF)
OD_FINDINGS: FULL
OS_FINDINGS: FULL

## 2023-06-15 ASSESSMENT — AXIALLENGTH_DERIVED
OD_AL: 24.7219
OS_AL: 24.3739
OS_AL: 24.4783
OD_AL: 24.6154
OD_AL: 24.6154
OS_AL: 24.3739

## 2023-06-15 ASSESSMENT — REFRACTION_AUTOREFRACTION
OD_SPHERE: -0.75
OS_CYLINDER: -0.50
OD_AXIS: 005
OS_AXIS: 010
OS_SPHERE: -1.00
OD_CYLINDER: -0.50

## 2023-06-15 ASSESSMENT — TONOMETRY
OD_IOP_MMHG: 15
OS_IOP_MMHG: 17

## 2023-11-14 PROBLEM — Z78.9 OTHER SPECIFIED HEALTH STATUS: Chronic | Status: ACTIVE | Noted: 2022-08-27

## 2023-12-06 ENCOUNTER — OFFICE (OUTPATIENT)
Dept: URBAN - METROPOLITAN AREA CLINIC 94 | Facility: CLINIC | Age: 33
Setting detail: OPHTHALMOLOGY
End: 2023-12-06
Payer: COMMERCIAL

## 2023-12-06 DIAGNOSIS — H40.013: ICD-10-CM

## 2023-12-06 PROCEDURE — 92083 EXTENDED VISUAL FIELD XM: CPT | Performed by: OPHTHALMOLOGY

## 2023-12-06 PROCEDURE — 76514 ECHO EXAM OF EYE THICKNESS: CPT | Performed by: OPHTHALMOLOGY

## 2023-12-06 PROCEDURE — 92133 CPTRZD OPH DX IMG PST SGM ON: CPT | Performed by: OPHTHALMOLOGY

## 2023-12-06 PROCEDURE — 92014 COMPRE OPH EXAM EST PT 1/>: CPT | Performed by: OPHTHALMOLOGY

## 2023-12-06 PROCEDURE — 92020 GONIOSCOPY: CPT | Performed by: OPHTHALMOLOGY

## 2023-12-06 ASSESSMENT — SPHEQUIV_DERIVED
OD_SPHEQUIV: -0.75
OS_SPHEQUIV: -1
OS_SPHEQUIV: -1
OD_SPHEQUIV: -1
OD_SPHEQUIV: -0.75
OS_SPHEQUIV: -0.875

## 2023-12-06 ASSESSMENT — REFRACTION_AUTOREFRACTION
OS_AXIS: 017
OD_CYLINDER: -0.50
OS_SPHERE: -0.75
OD_AXIS: 172
OD_SPHERE: -0.75
OS_CYLINDER: -0.25

## 2023-12-06 ASSESSMENT — REFRACTION_MANIFEST
OS_AXIS: 010
OD_SPHERE: -0.50
OS_VA1: 20/25
OD_CYLINDER: -0.50
OD_VA1: 20/25
OS_SPHERE: -0.75
OS_CYLINDER: -0.50
OD_CYLINDER: -0.50
OD_AXIS: 170
OD_VA1: 20/25
OS_AXIS: 010
OS_VA1: 20/25
OS_CYLINDER: -0.50
OD_AXIS: 170
OD_SPHERE: -0.50
OS_SPHERE: -0.75

## 2023-12-06 ASSESSMENT — CONFRONTATIONAL VISUAL FIELD TEST (CVF)
OD_FINDINGS: FULL
OS_FINDINGS: FULL

## 2024-09-18 ENCOUNTER — OFFICE (OUTPATIENT)
Dept: URBAN - METROPOLITAN AREA CLINIC 94 | Facility: CLINIC | Age: 34
Setting detail: OPHTHALMOLOGY
End: 2024-09-18

## 2024-09-18 DIAGNOSIS — H40.013: ICD-10-CM

## 2024-09-18 PROCEDURE — 92012 INTRM OPH EXAM EST PATIENT: CPT | Performed by: OPHTHALMOLOGY

## 2024-09-18 PROCEDURE — 92250 FUNDUS PHOTOGRAPHY W/I&R: CPT | Performed by: OPHTHALMOLOGY

## 2024-09-18 ASSESSMENT — CONFRONTATIONAL VISUAL FIELD TEST (CVF)
OD_FINDINGS: FULL
OS_FINDINGS: FULL

## 2025-09-22 PROBLEM — S80.02XA CONTUSION OF KNEE, LEFT: Status: ACTIVE | Noted: 2025-09-22
